# Patient Record
Sex: MALE | Race: WHITE | NOT HISPANIC OR LATINO
[De-identification: names, ages, dates, MRNs, and addresses within clinical notes are randomized per-mention and may not be internally consistent; named-entity substitution may affect disease eponyms.]

---

## 2018-06-17 ENCOUNTER — TRANSCRIPTION ENCOUNTER (OUTPATIENT)
Age: 23
End: 2018-06-17

## 2018-06-29 ENCOUNTER — TRANSCRIPTION ENCOUNTER (OUTPATIENT)
Age: 23
End: 2018-06-29

## 2018-06-29 ENCOUNTER — INPATIENT (INPATIENT)
Facility: HOSPITAL | Age: 23
LOS: 8 days | Discharge: HOME | End: 2018-07-08
Attending: THORACIC SURGERY (CARDIOTHORACIC VASCULAR SURGERY) | Admitting: THORACIC SURGERY (CARDIOTHORACIC VASCULAR SURGERY)
Payer: SUBSIDIZED

## 2018-06-29 VITALS
OXYGEN SATURATION: 99 % | DIASTOLIC BLOOD PRESSURE: 92 MMHG | SYSTOLIC BLOOD PRESSURE: 135 MMHG | HEART RATE: 133 BPM | TEMPERATURE: 101 F | RESPIRATION RATE: 20 BRPM

## 2018-06-29 LAB
ALBUMIN SERPL ELPH-MCNC: 4 G/DL — SIGNIFICANT CHANGE UP (ref 3.5–5.2)
ALP SERPL-CCNC: 91 U/L — SIGNIFICANT CHANGE UP (ref 30–115)
ALT FLD-CCNC: 53 U/L — HIGH (ref 0–41)
ANION GAP SERPL CALC-SCNC: 15 MMOL/L — HIGH (ref 7–14)
APTT BLD: 30.1 SEC — SIGNIFICANT CHANGE UP (ref 27–39.2)
AST SERPL-CCNC: 54 U/L — HIGH (ref 0–41)
BASOPHILS # BLD AUTO: 0.02 K/UL — SIGNIFICANT CHANGE UP (ref 0–0.2)
BASOPHILS NFR BLD AUTO: 0.1 % — SIGNIFICANT CHANGE UP (ref 0–1)
BILIRUB SERPL-MCNC: 1.6 MG/DL — HIGH (ref 0.2–1.2)
BUN SERPL-MCNC: 11 MG/DL — SIGNIFICANT CHANGE UP (ref 10–20)
CALCIUM SERPL-MCNC: 9.2 MG/DL — SIGNIFICANT CHANGE UP (ref 8.5–10.1)
CHLORIDE SERPL-SCNC: 94 MMOL/L — LOW (ref 98–110)
CO2 SERPL-SCNC: 25 MMOL/L — SIGNIFICANT CHANGE UP (ref 17–32)
CREAT SERPL-MCNC: 0.8 MG/DL — SIGNIFICANT CHANGE UP (ref 0.7–1.5)
EOSINOPHIL # BLD AUTO: 0.04 K/UL — SIGNIFICANT CHANGE UP (ref 0–0.7)
EOSINOPHIL NFR BLD AUTO: 0.3 % — SIGNIFICANT CHANGE UP (ref 0–8)
GAS PNL BLDV: SIGNIFICANT CHANGE UP
GLUCOSE SERPL-MCNC: 107 MG/DL — HIGH (ref 70–99)
HCT VFR BLD CALC: 41.2 % — LOW (ref 42–52)
HGB BLD-MCNC: 14.2 G/DL — SIGNIFICANT CHANGE UP (ref 14–18)
IMM GRANULOCYTES NFR BLD AUTO: 0.6 % — HIGH (ref 0.1–0.3)
INR BLD: 1.68 RATIO — HIGH (ref 0.65–1.3)
LYMPHOCYTES # BLD AUTO: 1.11 K/UL — LOW (ref 1.2–3.4)
LYMPHOCYTES # BLD AUTO: 7.6 % — LOW (ref 20.5–51.1)
MCHC RBC-ENTMCNC: 30.9 PG — SIGNIFICANT CHANGE UP (ref 27–31)
MCHC RBC-ENTMCNC: 34.5 G/DL — SIGNIFICANT CHANGE UP (ref 32–37)
MCV RBC AUTO: 89.8 FL — SIGNIFICANT CHANGE UP (ref 80–94)
MONOCYTES # BLD AUTO: 1.55 K/UL — HIGH (ref 0.1–0.6)
MONOCYTES NFR BLD AUTO: 10.6 % — HIGH (ref 1.7–9.3)
MRSA PCR RESULT.: NEGATIVE — SIGNIFICANT CHANGE UP
NEUTROPHILS # BLD AUTO: 11.85 K/UL — HIGH (ref 1.4–6.5)
NEUTROPHILS NFR BLD AUTO: 80.8 % — HIGH (ref 42.2–75.2)
PLATELET # BLD AUTO: 345 K/UL — SIGNIFICANT CHANGE UP (ref 130–400)
POTASSIUM SERPL-MCNC: 4.2 MMOL/L — SIGNIFICANT CHANGE UP (ref 3.5–5)
POTASSIUM SERPL-SCNC: 4.2 MMOL/L — SIGNIFICANT CHANGE UP (ref 3.5–5)
PROT SERPL-MCNC: 7.7 G/DL — SIGNIFICANT CHANGE UP (ref 6–8)
PROTHROM AB SERPL-ACNC: 18.1 SEC — HIGH (ref 9.95–12.87)
RBC # BLD: 4.59 M/UL — LOW (ref 4.7–6.1)
RBC # FLD: 11.6 % — SIGNIFICANT CHANGE UP (ref 11.5–14.5)
SODIUM SERPL-SCNC: 134 MMOL/L — LOW (ref 135–146)
WBC # BLD: 14.66 K/UL — HIGH (ref 4.8–10.8)
WBC # FLD AUTO: 14.66 K/UL — HIGH (ref 4.8–10.8)

## 2018-06-29 RX ORDER — VANCOMYCIN HCL 1 G
1750 VIAL (EA) INTRAVENOUS EVERY 12 HOURS
Qty: 0 | Refills: 0 | Status: DISCONTINUED | OUTPATIENT
Start: 2018-06-29 | End: 2018-06-30

## 2018-06-29 RX ORDER — ENOXAPARIN SODIUM 100 MG/ML
40 INJECTION SUBCUTANEOUS DAILY
Qty: 0 | Refills: 0 | Status: DISCONTINUED | OUTPATIENT
Start: 2018-06-29 | End: 2018-07-02

## 2018-06-29 RX ORDER — ACETAMINOPHEN 500 MG
650 TABLET ORAL ONCE
Qty: 0 | Refills: 0 | Status: COMPLETED | OUTPATIENT
Start: 2018-06-29 | End: 2018-06-29

## 2018-06-29 RX ORDER — MEROPENEM 1 G/30ML
1000 INJECTION INTRAVENOUS EVERY 8 HOURS
Qty: 0 | Refills: 0 | Status: DISCONTINUED | OUTPATIENT
Start: 2018-06-29 | End: 2018-06-30

## 2018-06-29 RX ORDER — SODIUM CHLORIDE 9 MG/ML
3000 INJECTION, SOLUTION INTRAVENOUS ONCE
Qty: 0 | Refills: 0 | Status: COMPLETED | OUTPATIENT
Start: 2018-06-29 | End: 2018-06-29

## 2018-06-29 RX ORDER — ACETAMINOPHEN 500 MG
650 TABLET ORAL EVERY 6 HOURS
Qty: 0 | Refills: 0 | Status: DISCONTINUED | OUTPATIENT
Start: 2018-06-29 | End: 2018-07-02

## 2018-06-29 RX ORDER — CEFTRIAXONE 500 MG/1
1 INJECTION, POWDER, FOR SOLUTION INTRAMUSCULAR; INTRAVENOUS ONCE
Qty: 0 | Refills: 0 | Status: COMPLETED | OUTPATIENT
Start: 2018-06-29 | End: 2018-06-29

## 2018-06-29 RX ORDER — AZITHROMYCIN 500 MG/1
500 TABLET, FILM COATED ORAL ONCE
Qty: 0 | Refills: 0 | Status: COMPLETED | OUTPATIENT
Start: 2018-06-29 | End: 2018-06-29

## 2018-06-29 RX ORDER — SODIUM CHLORIDE 9 MG/ML
1000 INJECTION, SOLUTION INTRAVENOUS ONCE
Qty: 0 | Refills: 0 | Status: COMPLETED | OUTPATIENT
Start: 2018-06-29 | End: 2018-06-29

## 2018-06-29 RX ORDER — SODIUM CHLORIDE 9 MG/ML
1000 INJECTION INTRAMUSCULAR; INTRAVENOUS; SUBCUTANEOUS
Qty: 0 | Refills: 0 | Status: DISCONTINUED | OUTPATIENT
Start: 2018-06-29 | End: 2018-06-30

## 2018-06-29 RX ADMIN — SODIUM CHLORIDE 2000 MILLILITER(S): 9 INJECTION, SOLUTION INTRAVENOUS at 14:58

## 2018-06-29 RX ADMIN — Medication 250 MILLIGRAM(S): at 18:28

## 2018-06-29 RX ADMIN — Medication 650 MILLIGRAM(S): at 13:39

## 2018-06-29 RX ADMIN — MEROPENEM 100 MILLIGRAM(S): 1 INJECTION INTRAVENOUS at 17:44

## 2018-06-29 RX ADMIN — SODIUM CHLORIDE 125 MILLILITER(S): 9 INJECTION INTRAMUSCULAR; INTRAVENOUS; SUBCUTANEOUS at 17:44

## 2018-06-29 RX ADMIN — Medication 650 MILLIGRAM(S): at 20:02

## 2018-06-29 RX ADMIN — AZITHROMYCIN 255 MILLIGRAM(S): 500 TABLET, FILM COATED ORAL at 12:00

## 2018-06-29 RX ADMIN — SODIUM CHLORIDE 2000 MILLILITER(S): 9 INJECTION, SOLUTION INTRAVENOUS at 11:30

## 2018-06-29 RX ADMIN — Medication 650 MILLIGRAM(S): at 21:19

## 2018-06-29 RX ADMIN — CEFTRIAXONE 100 GRAM(S): 500 INJECTION, POWDER, FOR SOLUTION INTRAMUSCULAR; INTRAVENOUS at 11:30

## 2018-06-29 RX ADMIN — MEROPENEM 100 MILLIGRAM(S): 1 INJECTION INTRAVENOUS at 23:40

## 2018-06-29 NOTE — H&P ADULT - HISTORY OF PRESENT ILLNESS
22 y/o M with no PMHx presenting for 2 wk hx of cough, high grade temp of 103F, and red+swollen+purulent d/c from his tonsils. He went to  on the 17th and they sent him home with amoxicillin and 5 day course of prednisone. He is still on the abx course but he finished the prednisone taper. Now he is seeking medical attention bc his cough has become more productive, with thick green sputum production, as well as persistent fevers, and L sided pleuritic chest pain that worsens with deep breaths. CXR done here shows loculated left sided pleural effusion and +sepsis criteria. Case was d/w IR Dr Stark who feels there is not enough fluid to warrant a thoracentesis and recommended against ct chest due to radiation in a young pt. However, case was d/w Pulm fellow who recommended pursuing ct chest and providing broad coverage abx. Pt denies any vomiting/nausea, but has been having dec po intake for the past 2 wks.

## 2018-06-29 NOTE — ED PROVIDER NOTE - OBJECTIVE STATEMENT
24 yo M with no pmhx presents with fever, left rib pain and cough. Rib pain and cough started 5 days ago, progressively worsened. Cough productive with whitish sputum. Fever started yesterday, Tmax 102. Denies CP, neck pain, headache, abd pain, NVCD, back pain, dysuria.

## 2018-06-29 NOTE — CONSULT NOTE ADULT - ASSESSMENT
Impression:  - PNA following URI associated with loculated effusion    Recommendations:  - ABx coverage vanco/seth for now  - CT chest  - Impression:  - PNA following URI   - Pleural effusion on Cxr    Recommendations:  - ABx coverage vanco/seth for now  - MRSA nasal swab  - Bedside US didn't show flowing pleural fluid. CT chest to evaluated for loculated effusion/empyema   If present CT surgery eval  - OOB  - DVT Px

## 2018-06-29 NOTE — H&P ADULT - ASSESSMENT
Sepsis 2/2 PNA with Loculated Left Sided Pleural Effusion Sepsis 2/2 PNA with Loculated Left Sided Pleural Effusion, possibly post viral  -start vanc and seth, ID c/s  -check RSVP, strep throat, nasal mrsa, urine legionella, blood cx  -check CT Chest non, Pulm c/s, IR c/s  -pt was tachy and febrile on arrival, s/p 4L with improvement in vitals, will maintain on IVF   -check hepatitis panel, HIV  -elevated liver enzymes/coags could be due to viral inf, will trend Sepsis 2/2 PNA with Loculated Left Sided Pleural Effusion, possibly post viral  -start vanc and seth, ID c/s  -check RSVP, strep throat, nasal mrsa, urine legionella, blood cx  -check CT Chest non, Pulm c/s, IR c/s  -if loculated on ct, please reconsult IR, but if they do not want to intervene, then consult CTSx  -pt was tachy and febrile on arrival, s/p 4L with improvement in vitals, will maintain on IVF   -check hepatitis panel, HIV  -elevated liver enzymes/coags could be due to viral inf, will trend

## 2018-06-29 NOTE — H&P ADULT - NSHPLABSRESULTS_GEN_ALL_CORE
14.2   14.66 )-----------( 345      ( 29 Jun 2018 11:26 )             41.2   06-29  134<L>  |  94<L>  |  11  ----------------------------<  107<H>  4.2   |  25  |  0.8  Ca    9.2      29 Jun 2018 11:26  TPro  7.7  /  Alb  4.0  /  TBili  1.6<H>  /  DBili  x   /  AST  54<H>  /  ALT  53<H>  /  AlkPhos  91  06-29  LIVER FUNCTIONS - ( 29 Jun 2018 11:26 )  Alb: 4.0 g/dL / Pro: 7.7 g/dL / ALK PHOS: 91 U/L / ALT: 53 U/L / AST: 54 U/L / GGT: x        PT/INR - ( 29 Jun 2018 11:26 )   PT: 18.10 sec;   INR: 1.68 ratio    PTT - ( 29 Jun 2018 11:26 )  PTT:30.1 sec 14.2   14.66 )-----------( 345      ( 29 Jun 2018 11:26 )             41.2   06-29  134<L>  |  94<L>  |  11  ----------------------------<  107<H>  4.2   |  25  |  0.8  Ca    9.2      29 Jun 2018 11:26  TPro  7.7  /  Alb  4.0  /  TBili  1.6<H>  /  DBili  x   /  AST  54<H>  /  ALT  53<H>  /  AlkPhos  91  06-29  LIVER FUNCTIONS - ( 29 Jun 2018 11:26 )  Alb: 4.0 g/dL / Pro: 7.7 g/dL / ALK PHOS: 91 U/L / ALT: 53 U/L / AST: 54 U/L / GGT: x        PT/INR - ( 29 Jun 2018 11:26 )   PT: 18.10 sec;   INR: 1.68 ratio    PTT - ( 29 Jun 2018 11:26 )  PTT:30.1 sec    CXR: Loculated left sided pleural effusion with left basilar opacity.  Small right basilar effusion and opacity.

## 2018-06-29 NOTE — ED ADULT NURSE NOTE - OBJECTIVE STATEMENT
pt comes in with productive cough, fever, and left rib pain when he takes deep breathes. is on amox 500 bid from the 17th and finished his prednisone taper.

## 2018-06-29 NOTE — ED PROVIDER NOTE - ATTENDING CONTRIBUTION TO CARE
23 y.o. M, no PMH, c/o fever/chills, cough, left sided rib pain for 5 days, getting progressively worse. No n/v/c/d. No leg pain/swelling. No travel. On exam, pt in NAD, AAOx3, head NC/AT, CN II-XII intact, lungs slightly decreased BS on the left, CTA on the right, CV S1S2 regular, abdomen soft/NT/ND/(+)BS, ext (-) edema. Will do labs, CXR, and reevaluate.

## 2018-06-29 NOTE — H&P ADULT - NSHPPHYSICALEXAM_GEN_ALL_CORE
General: well appearing gentleman sitting up in bed  HEENT: +swollen, erythematous tonsils bilaterally, no purulence noted which was appreciated from 1 wk prior based on pics the pt had on his phone  Cardiac: RRR, S1S2  Lungs: CTAB, +pleuritic chest pain on deep inspiration  Abd: NTND, +BS  LE: no edema T(F): 103.1 (06-29-18 @ 23:35), Max: 103.1 (06-29-18 @ 23:35)  HR: 115 (06-29-18 @ 23:35) (81 - 133)  BP: 137/80 (06-29-18 @ 23:35) (129/79 - 137/80)  RR: 20 (06-29-18 @ 23:35) (18 - 20)  SpO2: 99% (06-29-18 @ 23:35) (95% - 100%)    General: well appearing gentleman sitting up in bed  HEENT: +swollen, erythematous tonsils bilaterally, no purulence noted which was appreciated from 1 wk prior based on pics the pt had on his phone  Cardiac: RRR, S1S2  Lungs: CTAB, +pleuritic chest pain on deep inspiration  Abd: NTND, +BS  LE: no edema

## 2018-06-29 NOTE — ED PROVIDER NOTE - NS ED ROS FT
Constitutional: See HPI.  Eyes: No visual changes, eye pain or discharge.  ENMT: No neck pain or stiffness.  Cardiac: No chest pain, SOB or edema. No chest pain with exertion.  Respiratory: +cough . No respiratory distress.   GI: No nausea, vomiting, diarrhea or abdominal pain.  : No dysuria, frequency or burning.  MS: No myalgia, muscle weakness, joint pain or back pain.  Neuro: No headache or weakness. No LOC.  Skin: No skin rash.

## 2018-06-29 NOTE — H&P ADULT - ATTENDING COMMENTS
22 y/o M with no PMHx presenting for 2 wk hx of cough, high grade temp of 103F, and red+swollen+purulent d/c from his tonsils. He went to  on the 17th and they sent him home with amoxicillin and 5 day course of prednisone. He is still on the abx course but he finished the prednisone taper. Now he is seeking medical attention bc his cough has become more productive, with thick green sputum production, as well as persistent fevers, and L sided pleuritic chest pain that worsens with deep breaths. CXR done here shows loculated left sided pleural effusion and +sepsis criteria. Case was d/w IR Dr Stark who feels there is not enough fluid to warrant a thoracentesis and recommended against ct chest due to radiation in a young pt. However, case was d/w Pulm fellow who recommended pursuing ct chest and providing broad coverage abx. Pt denies any vomiting/nausea, but has been having dec po intake for the past 2 wks.    ROS:  CONSTITUTIONAL: No fever, chills, generalized weakness or fatigue.  EYES: No eye pain, visual disturbances, or discharge.  ENMT:  No difficulty hearing, tinnitus, vertigo; No sinus or throat pain.  RESPIRATORY: No cough, wheezing, chills or hemoptysis; No shortness of breath.  CARDIOVASCULAR: No chest pain, palpitations, dizziness, or leg swelling.  GASTROINTESTINAL: No abdominal or epigastric pain. No nausea, vomiting, or hematemesis; No diarrhea or constipation. No melena or hematochezia.  GENITOURINARY: No dysuria, frequency, hematuria, or incontinence.  NEUROLOGICAL: No headaches, memory loss, loss of strength, numbness, or tremors.  ENDOCRINE: No heat or cold intolerance; No hair loss.  MUSCULOSKELETAL: No joint pain or swelling; No muscle, back, or extremity pain.  HEME/LYMPH: No easy bruising, or bleeding gums.  SKIN: No rash or itchiness.    PE:  GENERAL: NAD, well-groomed, well-developed.  HEAD:  Atraumatic, Normocephalic.  EYES: EOMI, PERRLA, conjunctiva and sclera clear.  ENMT: Moist mucous membranes, Good dentition, No lesions.  NERVOUS SYSTEM:  Alert & Oriented X3, Good concentration; No limb weakness or numbness.  RESPIRATORY: Clear to percussion bilaterally; No rales, rhonchi, wheezing, or rubs.  CARDIOVASCULAR: Regular rate and rhythm; No murmurs, rubs, or gallops.  GASTROINTESTINAL: Soft, Nontender, Nondistended; Bowel sounds present.  MUSCULOSKELETAL: Motor Strength 5/5 B/L upper and lower extremities;   EXTREMITIES:  2+ Peripheral Pulses, No clubbing, cyanosis, or edema.  LYMPH: No lymphadenopathy noted.  SKIN: No rashes or lesions.    Sepsis 2/2 PNA with Loculated Left Sided Pleural Effusion, possibly post viral  -start vanc and seth, ID c/s  -check RSVP, strep throat, nasal mrsa, urine legionella, blood cx  -check CT Chest non, Pulm c/s, IR c/s  -if loculated on ct, please reconsult IR, but if they do not want to intervene, then consult CTSx  -pt was tachy and febrile on arrival, s/p 4L with improvement in vitals, will maintain on IVF   -check hepatitis panel, HIV  -elevated liver enzymes/coags could be due to viral inf, will trend    All labs, radiologic studies, vitals, orders and medications list reviewed. Patient is seen and examined at bedside. Case discussed with medical team. 22 y/o M with no previous PMH presented with progressive productive cough with , fever up [id number], and red+swollen+purulent discharge from his tonsils for past 2 weeks no improved with outpatient course of amoxicillin and prednisone. He is still on the abx course but he finished the prednisone taper. Now he is seeking medical attention bc his cough has become more productive, with thick green sputum production, as well as persistent fevers, and L sided pleuritic chest pain that worsens with deep breaths. CXR done here shows loculated left sided pleural effusion and +sepsis criteria. Case was d/w IR Dr Stark who feels there is not enough fluid to warrant a thoracentesis and recommended against ct chest due to radiation in a young pt. However, case was d/w Pulm fellow who recommended pursuing ct chest and providing broad coverage abx. Pt denies any vomiting/nausea, but has been having dec po intake for the past 2 wks.    ROS:  CONSTITUTIONAL: No fever, chills, generalized weakness or fatigue.  EYES: No eye pain, visual disturbances, or discharge.  ENMT:  No difficulty hearing, tinnitus, vertigo; No sinus or throat pain.  RESPIRATORY: No cough, wheezing, chills or hemoptysis; No shortness of breath.  CARDIOVASCULAR: No chest pain, palpitations, dizziness, or leg swelling.  GASTROINTESTINAL: No abdominal or epigastric pain. No nausea, vomiting, or hematemesis; No diarrhea or constipation. No melena or hematochezia.  GENITOURINARY: No dysuria, frequency, hematuria, or incontinence.  NEUROLOGICAL: No headaches, memory loss, loss of strength, numbness, or tremors.  ENDOCRINE: No heat or cold intolerance; No hair loss.  MUSCULOSKELETAL: No joint pain or swelling; No muscle, back, or extremity pain.  HEME/LYMPH: No easy bruising, or bleeding gums.  SKIN: No rash or itchiness.    PE:  GENERAL: NAD, well-groomed, well-developed.  HEAD:  Atraumatic, Normocephalic.  EYES: EOMI, PERRLA, conjunctiva and sclera clear.  ENMT: Moist mucous membranes, Good dentition, No lesions.  NERVOUS SYSTEM:  Alert & Oriented X3, Good concentration; No limb weakness or numbness.  RESPIRATORY: Clear to percussion bilaterally; No rales, rhonchi, wheezing, or rubs.  CARDIOVASCULAR: Regular rate and rhythm; No murmurs, rubs, or gallops.  GASTROINTESTINAL: Soft, Nontender, Nondistended; Bowel sounds present.  MUSCULOSKELETAL: Motor Strength 5/5 B/L upper and lower extremities;   EXTREMITIES:  2+ Peripheral Pulses, No clubbing, cyanosis, or edema.  LYMPH: No lymphadenopathy noted.  SKIN: No rashes or lesions.    Sepsis 2/2 PNA with Loculated Left Sided Pleural Effusion, possibly post viral  -start vanc and seth, ID c/s  -check RSVP, strep throat, nasal mrsa, urine legionella, blood cx  -check CT Chest non, Pulm c/s, IR c/s  -if loculated on ct, please reconsult IR, but if they do not want to intervene, then consult CTSx  -pt was tachy and febrile on arrival, s/p 4L with improvement in vitals, will maintain on IVF   -check hepatitis panel, HIV  -elevated liver enzymes/coags could be due to viral inf, will trend    All labs, radiologic studies, vitals, orders and medications list reviewed. Patient is seen and examined at bedside. Case discussed with medical team. 22 y/o M with no previous PMH presented with progressive productive cough with green sputum, persistent fever up to 103F, and red, swollen, purulent discharge from his tonsils for past 2 weeks not improved with outpatient course of amoxicillin and prednisone. Patient started to develop pleuritic left sided chest pain worse with breathing and movement. No prior history of immunosuppression or recurrent infections as a child.    ROS:  CONSTITUTIONAL: (+) fever. No chills, generalized weakness or fatigue.  EYES: No eye pain, visual disturbances, or discharge.  ENMT:  No difficulty hearing, tinnitus, vertigo; (+) Throat pain with red, swollen, purulent discharge from his tonsils  RESPIRATORY: (+) productive cough. No wheezing or hemoptysis; No shortness of breath.  CARDIOVASCULAR: Left side pleuritic chest pain. No palpitations, dizziness, or leg swelling.  GASTROINTESTINAL: No abdominal or epigastric pain. No nausea, vomiting, or hematemesis; No diarrhea or constipation. No melena or hematochezia.  GENITOURINARY: No dysuria, frequency, hematuria, or incontinence.  NEUROLOGICAL: No headaches, memory loss, loss of strength, numbness, or tremors.  ENDOCRINE: No heat or cold intolerance; No hair loss.  MUSCULOSKELETAL: No joint pain or swelling; No muscle, back, or extremity pain.  HEME/LYMPH: No easy bruising, or bleeding gums.  SKIN: No rash or itchiness.    PE:  GENERAL: NAD, well-groomed, well-developed.  HEAD:  Atraumatic, Normocephalic.  EYES: EOMI, scleral clear.   NERVOUS SYSTEM:  Alert & Oriented X3, Good concentration; No limb weakness or numbness.  RESPIRATORY: Decreased bibasilar breath sounds left greater than right. No wheezes, rales or rhonchi.  CARDIOVASCULAR: Tachycardic. Regular rhythm; No murmurs, rubs, or gallops.  GASTROINTESTINAL: Soft, Nontender, Nondistended; Bowel sounds present.  MUSCULOSKELETAL: Motor Strength 5/5 B/L upper and lower extremities;   EXTREMITIES:  2+ Peripheral Pulses, No clubbing, cyanosis, or edema.  LYMPH: Tender and enlarged tonsils.  SKIN: No rashes or lesions.    # Sepsis 2/2 Pneumonia with Loculated Left Sided Pleural Effusion  - started on IV vancomycin and IV meropenem   - follow up RSVP, strep throat, nasal MRSA, urine legionella, blood cultures  - order for CT Chest, awaiting result; CT Surgery to evaluate in AM regarding need for intervention  - f/u ID and Pulm evaluation    # Mild transaminitis, elevated bili and abnormal coagulation profile  - check hepatitis panel, HIV  - trend hepatic panel, PT/INR    # DVT prophylaxis  - on lovenox subcut  All labs, radiologic studies, vitals, orders and medications list reviewed. Patient is seen and examined at bedside. Case discussed with medical team. 22 y/o M with no previous PMH presented with progressive productive cough with green sputum, persistent fever up to 103F, and red, swollen, purulent discharge from his tonsils for past 2 weeks not improved with outpatient course of amoxicillin and prednisone. Patient started to develop pleuritic left sided chest pain worse with breathing and movement. No prior history of immunosuppression or recurrent infections as a child.    ROS:  CONSTITUTIONAL: (+) fever. No chills, generalized weakness or fatigue.  EYES: No eye pain, visual disturbances, or discharge.  ENMT:  No difficulty hearing, tinnitus, vertigo; (+) Throat pain with red, swollen, purulent discharge from his tonsils  RESPIRATORY: (+) productive cough. No wheezing or hemoptysis; No shortness of breath.  CARDIOVASCULAR: Left side pleuritic chest pain. No palpitations, dizziness, or leg swelling.  GASTROINTESTINAL: No abdominal or epigastric pain. No nausea, vomiting, or hematemesis; No diarrhea or constipation. No melena or hematochezia.  GENITOURINARY: No dysuria, frequency, hematuria, or incontinence.  NEUROLOGICAL: No headaches, memory loss, loss of strength, numbness, or tremors.  ENDOCRINE: No heat or cold intolerance; No hair loss.  MUSCULOSKELETAL: No joint pain or swelling; No muscle, back, or extremity pain.  HEME/LYMPH: No easy bruising, or bleeding gums.  SKIN: No rash or itchiness.    PE:  GENERAL: NAD, well-groomed, well-developed.  HEAD:  Atraumatic, Normocephalic.  EYES: EOMI, scleral clear.   NERVOUS SYSTEM:  Alert & Oriented X3, Good concentration; No limb weakness or numbness.  RESPIRATORY: Decreased bibasilar breath sounds left greater than right. No wheezes, rales or rhonchi.  CARDIOVASCULAR: Tachycardic. Regular rhythm; No murmurs, rubs, or gallops.  GASTROINTESTINAL: Soft, Nontender, Nondistended; Bowel sounds present.  MUSCULOSKELETAL: Motor Strength 5/5 B/L upper and lower extremities;   EXTREMITIES:  2+ Peripheral Pulses, No clubbing, cyanosis, or edema.  LYMPH: Tender and enlarged tonsils.  SKIN: No rashes or lesions.    # Sepsis 2/2 Pneumonia with Loculated Left Sided Pleural Effusion  - started on IV vancomycin and IV meropenem; check vancomycin trough   - follow up RSVP, strep throat, nasal MRSA, urine legionella, blood cultures  - order for CT Chest, awaiting result; CT Surgery to evaluate in AM regarding need for intervention  - f/u ID and Pulm evaluation    # Mild transaminitis, elevated bili and abnormal coagulation profile  - check hepatitis panel, HIV  - trend hepatic panel, PT/INR/PTT    # DVT prophylaxis  - on lovenox subcut  All labs, radiologic studies, vitals, orders and medications list reviewed. Patient is seen and examined at bedside. Case discussed with medical team.

## 2018-06-29 NOTE — ED PROVIDER NOTE - PHYSICAL EXAMINATION
AOx4, Non toxic appearing, NAD, speaking in full sentences. Skin  warm and dry, no acute rash. Head normocephalic, atraumatic. PERRLA/EOMI, conjunctiva and sclera clear. MM moist, no nasal discharge.  Pharynx and TM's unremarkable.  No mastoid or temporal ttp. Neck supple nt, no meningeal signs. Heart RRR s1s2 nl, no rub/murmur. Lungs- No retractions, decreased BS on the left lower lung field. Abdomen soft ntnd no r/g. Extremities- moves all, +equal distal pulses, brisk cap refill, sensation wnl, normal ROM. No LE edema, calves nttp b/l.

## 2018-06-29 NOTE — CHART NOTE - NSCHARTNOTEFT_GEN_A_CORE
Spoke to Dr. Caballero - the CT Surgeon on call, over the phone, discussed the case. Placed a consult for CT surgery and ordered Blood and Sputum cultures as requested. His team will see the patient tomorrow. Spoke to Dr. Caballero - the CT Surgeon on call, over the phone, discussed the case. Placed a consult for CT surgery and ordered Blood and Sputum cultures as requested. His team will see the patient tomorrow; not acutely concerned about the pt overnight.

## 2018-06-29 NOTE — ED PROVIDER NOTE - PROGRESS NOTE DETAILS
Sepsis suspected at this time. Patient mildly hypoxic with ambualtion, desating to 94%, tachycardiac to 114. Will admit

## 2018-06-29 NOTE — H&P ADULT - FAMILY HISTORY
Family history of pneumonia     Father  Still living? Unknown  Family history of leukemia, Age at diagnosis: Age Unknown  Family history of hypertension, Age at diagnosis: Age Unknown     Mother  Still living? Unknown  Family history of hypertension, Age at diagnosis: Age Unknown

## 2018-06-29 NOTE — H&P ADULT - NSHPSOCIALHISTORY_GEN_ALL_CORE
Occaisional weed, but never smoker. Does not use any illicit drugs. Has one sex partner, monogamous. Occasional weed, but never smoker. Does not use any illicit drugs. Has one sex partner, monogamous.

## 2018-06-29 NOTE — CHART NOTE - NSCHARTNOTEFT_GEN_A_CORE
I had sign out from admitting resident to check Ct chest to r/o empyema  earlier today CT chest was not done because patient was not in his room, I called and expedite doing it  CT chest was done  I called radiology twice till now to get the result and no report yet  Sign out given to float intern  Patient is Off RA, has some pain but stable for now  plan to check report and if positive for loculated empyema to call CT surgery for chest tube

## 2018-06-30 LAB
ALBUMIN SERPL ELPH-MCNC: 3.1 G/DL — LOW (ref 3.5–5.2)
ALP SERPL-CCNC: 86 U/L — SIGNIFICANT CHANGE UP (ref 30–115)
ALT FLD-CCNC: 53 U/L — HIGH (ref 0–41)
ANION GAP SERPL CALC-SCNC: 14 MMOL/L — SIGNIFICANT CHANGE UP (ref 7–14)
APPEARANCE UR: (no result)
APTT BLD: 29.3 SEC — SIGNIFICANT CHANGE UP (ref 27–39.2)
AST SERPL-CCNC: 36 U/L — SIGNIFICANT CHANGE UP (ref 0–41)
BASOPHILS # BLD AUTO: 0.02 K/UL — SIGNIFICANT CHANGE UP (ref 0–0.2)
BASOPHILS NFR BLD AUTO: 0.2 % — SIGNIFICANT CHANGE UP (ref 0–1)
BILIRUB SERPL-MCNC: 1.5 MG/DL — HIGH (ref 0.2–1.2)
BILIRUB UR-MCNC: NEGATIVE — SIGNIFICANT CHANGE UP
BUN SERPL-MCNC: 7 MG/DL — LOW (ref 10–20)
CALCIUM SERPL-MCNC: 8.3 MG/DL — LOW (ref 8.5–10.1)
CHLORIDE SERPL-SCNC: 99 MMOL/L — SIGNIFICANT CHANGE UP (ref 98–110)
CO2 SERPL-SCNC: 25 MMOL/L — SIGNIFICANT CHANGE UP (ref 17–32)
COLOR SPEC: SIGNIFICANT CHANGE UP
CREAT SERPL-MCNC: 0.6 MG/DL — LOW (ref 0.7–1.5)
DIFF PNL FLD: (no result)
EOSINOPHIL # BLD AUTO: 0.05 K/UL — SIGNIFICANT CHANGE UP (ref 0–0.7)
EOSINOPHIL NFR BLD AUTO: 0.4 % — SIGNIFICANT CHANGE UP (ref 0–8)
GLUCOSE SERPL-MCNC: 109 MG/DL — HIGH (ref 70–99)
GLUCOSE UR QL: NEGATIVE MG/DL — SIGNIFICANT CHANGE UP
HBV SURFACE AG SER-ACNC: SIGNIFICANT CHANGE UP
HCT VFR BLD CALC: 33.6 % — LOW (ref 42–52)
HCV AB S/CO SERPL IA: 0.13 S/CO — SIGNIFICANT CHANGE UP
HCV AB SERPL-IMP: SIGNIFICANT CHANGE UP
HGB BLD-MCNC: 11.4 G/DL — LOW (ref 14–18)
IMM GRANULOCYTES NFR BLD AUTO: 0.7 % — HIGH (ref 0.1–0.3)
INR BLD: 2.01 RATIO — HIGH (ref 0.65–1.3)
KETONES UR-MCNC: NEGATIVE — SIGNIFICANT CHANGE UP
LEUKOCYTE ESTERASE UR-ACNC: NEGATIVE — SIGNIFICANT CHANGE UP
LYMPHOCYTES # BLD AUTO: 1.53 K/UL — SIGNIFICANT CHANGE UP (ref 1.2–3.4)
LYMPHOCYTES # BLD AUTO: 11.9 % — LOW (ref 20.5–51.1)
MCHC RBC-ENTMCNC: 31.1 PG — HIGH (ref 27–31)
MCHC RBC-ENTMCNC: 33.9 G/DL — SIGNIFICANT CHANGE UP (ref 32–37)
MCV RBC AUTO: 91.8 FL — SIGNIFICANT CHANGE UP (ref 80–94)
MONOCYTES # BLD AUTO: 1.49 K/UL — HIGH (ref 0.1–0.6)
MONOCYTES NFR BLD AUTO: 11.6 % — HIGH (ref 1.7–9.3)
NEUTROPHILS # BLD AUTO: 9.66 K/UL — HIGH (ref 1.4–6.5)
NEUTROPHILS NFR BLD AUTO: 75.2 % — SIGNIFICANT CHANGE UP (ref 42.2–75.2)
NITRITE UR-MCNC: NEGATIVE — SIGNIFICANT CHANGE UP
PH UR: 6.5 — SIGNIFICANT CHANGE UP (ref 5–8)
PLATELET # BLD AUTO: 282 K/UL — SIGNIFICANT CHANGE UP (ref 130–400)
POTASSIUM SERPL-MCNC: 3.9 MMOL/L — SIGNIFICANT CHANGE UP (ref 3.5–5)
POTASSIUM SERPL-SCNC: 3.9 MMOL/L — SIGNIFICANT CHANGE UP (ref 3.5–5)
PROT SERPL-MCNC: 6.2 G/DL — SIGNIFICANT CHANGE UP (ref 6–8)
PROT UR-MCNC: (no result) MG/DL
PROTHROM AB SERPL-ACNC: 21.6 SEC — HIGH (ref 9.95–12.87)
RAPID RVP RESULT: SIGNIFICANT CHANGE UP
RBC # BLD: 3.66 M/UL — LOW (ref 4.7–6.1)
RBC # FLD: 11.9 % — SIGNIFICANT CHANGE UP (ref 11.5–14.5)
S PYO DNA THROAT QL NAA+PROBE: SIGNIFICANT CHANGE UP
SODIUM SERPL-SCNC: 138 MMOL/L — SIGNIFICANT CHANGE UP (ref 135–146)
SP GR SPEC: 1.02 — SIGNIFICANT CHANGE UP (ref 1.01–1.03)
UROBILINOGEN FLD QL: 2 MG/DL (ref 0.2–0.2)
WBC # BLD: 12.84 K/UL — HIGH (ref 4.8–10.8)
WBC # FLD AUTO: 12.84 K/UL — HIGH (ref 4.8–10.8)

## 2018-06-30 RX ORDER — CEFEPIME 1 G/1
INJECTION, POWDER, FOR SOLUTION INTRAMUSCULAR; INTRAVENOUS
Qty: 0 | Refills: 0 | Status: DISCONTINUED | OUTPATIENT
Start: 2018-06-30 | End: 2018-07-02

## 2018-06-30 RX ORDER — PHYTONADIONE (VIT K1) 5 MG
10 TABLET ORAL ONCE
Qty: 0 | Refills: 0 | Status: COMPLETED | OUTPATIENT
Start: 2018-06-30 | End: 2018-06-30

## 2018-06-30 RX ORDER — OXYCODONE HYDROCHLORIDE 5 MG/1
5 TABLET ORAL EVERY 4 HOURS
Qty: 0 | Refills: 0 | Status: DISCONTINUED | OUTPATIENT
Start: 2018-06-30 | End: 2018-07-02

## 2018-06-30 RX ORDER — SODIUM CHLORIDE 9 MG/ML
1000 INJECTION INTRAMUSCULAR; INTRAVENOUS; SUBCUTANEOUS
Qty: 0 | Refills: 0 | Status: DISCONTINUED | OUTPATIENT
Start: 2018-06-30 | End: 2018-07-02

## 2018-06-30 RX ORDER — CEFEPIME 1 G/1
2000 INJECTION, POWDER, FOR SOLUTION INTRAMUSCULAR; INTRAVENOUS EVERY 8 HOURS
Qty: 0 | Refills: 0 | Status: DISCONTINUED | OUTPATIENT
Start: 2018-06-30 | End: 2018-07-02

## 2018-06-30 RX ORDER — CEFEPIME 1 G/1
2000 INJECTION, POWDER, FOR SOLUTION INTRAMUSCULAR; INTRAVENOUS ONCE
Qty: 0 | Refills: 0 | Status: COMPLETED | OUTPATIENT
Start: 2018-06-30 | End: 2018-06-30

## 2018-06-30 RX ORDER — OXYCODONE HYDROCHLORIDE 5 MG/1
10 TABLET ORAL EVERY 4 HOURS
Qty: 0 | Refills: 0 | Status: DISCONTINUED | OUTPATIENT
Start: 2018-06-30 | End: 2018-07-02

## 2018-06-30 RX ORDER — IBUPROFEN 200 MG
600 TABLET ORAL EVERY 8 HOURS
Qty: 0 | Refills: 0 | Status: DISCONTINUED | OUTPATIENT
Start: 2018-06-30 | End: 2018-07-02

## 2018-06-30 RX ADMIN — OXYCODONE HYDROCHLORIDE 5 MILLIGRAM(S): 5 TABLET ORAL at 23:56

## 2018-06-30 RX ADMIN — CEFEPIME 100 MILLIGRAM(S): 1 INJECTION, POWDER, FOR SOLUTION INTRAMUSCULAR; INTRAVENOUS at 21:56

## 2018-06-30 RX ADMIN — Medication 650 MILLIGRAM(S): at 13:21

## 2018-06-30 RX ADMIN — MEROPENEM 100 MILLIGRAM(S): 1 INJECTION INTRAVENOUS at 07:02

## 2018-06-30 RX ADMIN — SODIUM CHLORIDE 100 MILLILITER(S): 9 INJECTION INTRAMUSCULAR; INTRAVENOUS; SUBCUTANEOUS at 15:07

## 2018-06-30 RX ADMIN — CEFEPIME 100 MILLIGRAM(S): 1 INJECTION, POWDER, FOR SOLUTION INTRAMUSCULAR; INTRAVENOUS at 11:35

## 2018-06-30 RX ADMIN — SODIUM CHLORIDE 125 MILLILITER(S): 9 INJECTION INTRAMUSCULAR; INTRAVENOUS; SUBCUTANEOUS at 06:33

## 2018-06-30 RX ADMIN — Medication 100 MILLIGRAM(S): at 14:30

## 2018-06-30 RX ADMIN — Medication 650 MILLIGRAM(S): at 21:08

## 2018-06-30 RX ADMIN — Medication 600 MILLIGRAM(S): at 11:40

## 2018-06-30 RX ADMIN — Medication 600 MILLIGRAM(S): at 18:03

## 2018-06-30 RX ADMIN — Medication 250 MILLIGRAM(S): at 06:33

## 2018-06-30 RX ADMIN — Medication 650 MILLIGRAM(S): at 14:30

## 2018-06-30 RX ADMIN — Medication 10 MILLIGRAM(S): at 14:06

## 2018-06-30 RX ADMIN — Medication 100 MILLIGRAM(S): at 21:09

## 2018-06-30 RX ADMIN — Medication 600 MILLIGRAM(S): at 17:01

## 2018-06-30 RX ADMIN — Medication 650 MILLIGRAM(S): at 02:38

## 2018-06-30 RX ADMIN — ENOXAPARIN SODIUM 40 MILLIGRAM(S): 100 INJECTION SUBCUTANEOUS at 11:41

## 2018-06-30 NOTE — PROGRESS NOTE ADULT - SUBJECTIVE AND OBJECTIVE BOX
IMPRESSION:      PLAN:    CNS: DAILY SEDATION VACATION    HEENT:  Oral care    PULMONARY:  HOB @ 45 degrees    CARDIOVASCULAR:    GI: GI prophylaxis                                          Feeding     RENAL:  F/u  lytes.  Correct as needed     INFECTIOUS DISEASE:    HEMATOLOGICAL:  DVT prophylaxis.    ENDOCRINE:  Follow up FS.  Insulin protocol if needed.    MUSCULOSKELETAL:    CODE STATUS: FULL CODE    DISPOSITION: Pt requires continued monitoring in the MICU

## 2018-06-30 NOTE — CONSULT NOTE ADULT - SUBJECTIVE AND OBJECTIVE BOX
TAMI JOHNSON  23y, Male  Allergy: No Known Allergies      HPI:  22 y/o M with no PMHx presenting for 2 wk hx of cough, high grade temp of 103F, and red+swollen+purulent d/c from his tonsils. He went to  on the 17th and they sent him home with amoxicillin and 5 day course of prednisone. He is still on the abx course but he finished the prednisone taper. Now he is seeking medical attention bc his cough has become more productive, with thick green sputum production, as well as persistent fevers, and L sided pleuritic chest pain that worsens with deep breaths. CXR done here shows loculated left sided pleural effusion and +sepsis criteria. Case was d/w IR Dr Stark who feels there is not enough fluid to warrant a thoracentesis and recommended against ct chest due to radiation in a young pt. However, case was d/w Pulm fellow who recommended pursuing ct chest and providing broad coverage abx. Pt denies any vomiting/nausea, but has been having dec po intake for the past 2 wks.     FAMILY HISTORY:  Family history of pneumonia  Family history of hypertension (Father, Mother)  Family history of leukemia (Father)    PAST MEDICAL & SURGICAL HISTORY:  No pertinent past medical history  No significant past surgical history        VITALS:  T(F): 97.8, Max: 103.1 (06-29-18 @ 23:35)  HR: 96  BP: 118/68  RR: 18Vital Signs Last 24 Hrs  T(C): 36.6 (30 Jun 2018 08:05), Max: 39.5 (29 Jun 2018 23:35)  T(F): 97.8 (30 Jun 2018 08:05), Max: 103.1 (29 Jun 2018 23:35)  HR: 96 (30 Jun 2018 08:05) (81 - 133)  BP: 118/68 (30 Jun 2018 08:05) (118/68 - 137/80)  BP(mean): 98 (29 Jun 2018 17:22) (98 - 98)  RR: 18 (30 Jun 2018 08:05) (18 - 20)  SpO2: 98% (30 Jun 2018 08:05) (95% - 100%)    TESTS & MEASUREMENTS:                        11.4   12.84 )-----------( 282      ( 30 Jun 2018 06:17 )             33.6     06-30    138  |  99  |  7<L>  ----------------------------<  109<H>  3.9   |  25  |  0.6<L>    Ca    8.3<L>      30 Jun 2018 06:17    TPro  6.2  /  Alb  3.1<L>  /  TBili  1.5<H>  /  DBili  x   /  AST  36  /  ALT  53<H>  /  AlkPhos  86  06-30    LIVER FUNCTIONS - ( 30 Jun 2018 06:17 )  Alb: 3.1 g/dL / Pro: 6.2 g/dL / ALK PHOS: 86 U/L / ALT: 53 U/L / AST: 36 U/L / GGT: x                   RADIOLOGY & ADDITIONAL TESTS:    ANTIBIOTICS:  cefepime   IVPB      clindamycin IVPB      levoFLOXacin IVPB

## 2018-06-30 NOTE — CHART NOTE - NSCHARTNOTEFT_GEN_A_CORE
General Thoracic Surgery Attestation    I have seen and examined the patient.  Where appropriate I have updated, edited, or corrected the resident's or PA's note with regard to findings, values, and plan.    multiloculated pleural effusion in the setting of recent upper airway infection.  discussed course of thora, fluid studied, chest tube, tpa, repeat imaging.  In a young patient within a 2 week window, with what seems like fairly rapid evolution of thoracic pathology (first symptoms of chest issue <1 week ago) would plan for up front decortication for maximum efficacy and long term lung function.  needs vitamin K to normalize INR, would prefer to avoid plasma if possible.  pain medication is certainly fine with me, as is a diet ad karlos until midnight tomorrow.  plan for left vats decortication monday a.m.

## 2018-06-30 NOTE — CONSULT NOTE ADULT - ASSESSMENT
IMPRESSION:  Sepsis secondary to LLL bacterial PNA with empyema.    RECOMMENDATIONS:  Blood cultures.   Urine for legionella antigen.  Decortication planned.  Levofloxacin 750 mg iv q24h  Clindamycin 900 mg iv q8h  Cefepime 2 gm iv q8h.

## 2018-06-30 NOTE — PROGRESS NOTE ADULT - ASSESSMENT
Impression:  - PNA following URI   - Pleural effusion on Cxr    Recommendations:  - ABx coverage vanco/seth for now  - MRSA nasal swab  - Bedside US didn't show flowing pleural fluid. CT chest to evaluated for loculated effusion/empyema   If present CT surgery eval  - OOB  - DVT Px

## 2018-06-30 NOTE — CONSULT NOTE ADULT - SUBJECTIVE AND OBJECTIVE BOX
GENERAL SURGERY CONSULT NOTE    TAMI JOHNSON  [id number]  [facility] [address]  23yMale  Hospital Day#: 1d    HPI:  24 y/o M with no PMHx presenting for 2 wk hx of cough, high grade temp of 103F, and red+swollen+purulent d/c from his tonsils. He went to  on the 17th and they sent him home with amoxicillin and 5 day course of prednisone. He is still on the abx course but he finished the prednisone taper. Now he is seeking medical attention bc his cough has become more productive, with thick green sputum production, as well as persistent fevers, and L sided pleuritic chest pain that worsens with deep breaths. CXR done here shows loculated left sided pleural effusion and +sepsis criteria. Case was d/w IR Dr Stark who feels there is not enough fluid to warrant a thoracentesis and recommended against ct chest due to radiation in a young pt. However, case was d/w Pulm fellow who recommended pursuing ct chest and providing broad coverage abx. Pt denies any vomiting/nausea, but has been having dec po intake for the past 2 wks. (29 Jun 2018 14:27)    PAST MEDICAL & SURGICAL HISTORY:  No pertinent past medical history  No significant past surgical history    Allergies: No Known Allergies    Home Medications: None    Vital Signs Last 24 Hrs  T(C): 36.6 (30 Jun 2018 08:05), Max: 39.5 (29 Jun 2018 23:35)  T(F): 97.8 (30 Jun 2018 08:05), Max: 103.1 (29 Jun 2018 23:35)  HR: 96 (30 Jun 2018 08:05) (81 - 120)  BP: 118/68 (30 Jun 2018 08:05) (118/68 - 137/80)  BP(mean): 98 (29 Jun 2018 17:22) (98 - 98)  RR: 18 (30 Jun 2018 08:05) (18 - 20)  SpO2: 98% (30 Jun 2018 08:05) (95% - 100%)    PHYSICAL EXAM  	General: well appearing gentleman sitting up in bed  	HEENT: +swollen, erythematous tonsils bilaterally, no purulence noted which was appreciated from 1 wk prior based on pics the pt had on his phone  	Cardiac: RRR, S1S2  	Lungs: CTAB, +pleuritic chest pain on deep inspiration  	Abd: NTND, +BS    LE: no edema    MEDICATIONS  (STANDING):  cefepime   IVPB 2000 milliGRAM(s) IV Intermittent every 8 hours  cefepime   IVPB      cefepime   IVPB 2000 milliGRAM(s) IV Intermittent once  clindamycin IVPB 900 milliGRAM(s) IV Intermittent once  clindamycin IVPB      clindamycin IVPB 900 milliGRAM(s) IV Intermittent every 8 hours  enoxaparin Injectable 40 milliGRAM(s) SubCutaneous daily  levoFLOXacin IVPB      levoFLOXacin IVPB 750 milliGRAM(s) IV Intermittent once  phytonadione   Solution 10 milliGRAM(s) Oral once  sodium chloride 0.9%. 1000 milliLiter(s) (125 mL/Hr) IV Continuous <Continuous>    MEDICATIONS  (PRN):  acetaminophen   Tablet. 650 milliGRAM(s) Oral every 6 hours PRN Moderate Pain (4 - 6)      LAB/STUDIES:             11.4   12.84 )-----------( 282      ( 30 Jun 2018 06:17 )             33.6     06-30    138  |  99  |  7<L>  ----------------------------<  109<H>  3.9   |  25  |  0.6<L>    Ca    8.3<L>      30 Jun 2018 06:17    TPro  6.2  /  Alb  3.1<L>  /  TBili  1.5<H>  /  DBili  x   /  AST  36  /  ALT  53<H>  /  AlkPhos  86  06-30    PT/INR - ( 30 Jun 2018 06:17 )   PT: 21.60 sec;   INR: 2.01 ratio         PTT - ( 30 Jun 2018 06:17 )  PTT:29.3 sec  LIVER FUNCTIONS - ( 30 Jun 2018 06:17 )  Alb: 3.1 g/dL / Pro: 6.2 g/dL / ALK PHOS: 86 U/L / ALT: 53 U/L / AST: 36 U/L / GGT: x           IMAGING:  CT Chest No Cont (06.29.18 @ 19:17) >  IMPRESSION:    1.  Loculated left pleural effusion with adjacent compressive atelectatic   changes of the left lower lobe.     2.  Left upper lobe pleural-based 1 cm solid nodule and right upper lobe   4 mm solid nodule, likely infectious in nature.

## 2018-06-30 NOTE — PROGRESS NOTE ADULT - SUBJECTIVE AND OBJECTIVE BOX
SUBJECTIVE:    Patient is a 23y old Male who presents with a chief complaint of Left sided pneumonia (29 Jun 2018 14:27)    Currently admitted to medicine with the primary diagnosis of Pneumonia     Today is hospital day 1d. This morning he is resting comfortably in bed and reports improvement in his breathing, he is able to take deep breath and feels much better.    PAST MEDICAL & SURGICAL HISTORY  No pertinent past medical history  No significant past surgical history    SOCIAL HISTORY:  Negative for smoking/alcohol/drug use.     ALLERGIES:  No Known Allergies    MEDICATIONS:  STANDING MEDICATIONS  enoxaparin Injectable 40 milliGRAM(s) SubCutaneous daily  meropenem  IVPB 1000 milliGRAM(s) IV Intermittent every 8 hours  sodium chloride 0.9%. 1000 milliLiter(s) IV Continuous <Continuous>  vancomycin  IVPB 1750 milliGRAM(s) IV Intermittent every 12 hours    PRN MEDICATIONS  acetaminophen   Tablet. 650 milliGRAM(s) Oral every 6 hours PRN    VITALS:   T(C): 36.6, Max: 39.5 (06-29-18 @ 23:35)  T(F): 97.8, Max: 103.1 (06-29-18 @ 23:35)  HR: 96 (81 - 133)  BP: 118/68 (118/68 - 137/80)  RR: 18 (18 - 20)  SpO2: 98% (95% - 100%)    LABS:                        11.4   12.84 )-----------( 282      ( 30 Jun 2018 06:17 )             33.6     06-30    138  |  99  |  7<L>  ----------------------------<  109<H>  3.9   |  25  |  0.6<L>    Ca    8.3<L>      30 Jun 2018 06:17    TPro  6.2  /  Alb  3.1<L>  /  TBili  1.5<H>  /  DBili  x   /  AST  36  /  ALT  53<H>  /  AlkPhos  86  06-30    PT/INR - ( 30 Jun 2018 06:17 )   PT: 21.60 sec;   INR: 2.01 ratio         PTT - ( 30 Jun 2018 06:17 )  PTT:29.3 sec      RADIOLOGY:    < from: CT Chest No Cont (06.29.18 @ 19:17) >  1.  Loculated left pleural effusion with adjacent compressive atelectatic   changes of the left lower lobe.     2.  Left upper lobe pleural-based 1 cm solid nodule and right upper lobe   4 mm solid nodule, likely infectious in nature.     < end of copied text >      PHYSICAL EXAM:  GEN: No acute distress  LUNGS: right sided crackles plus wheezing?  HEART: S1/S2 present. RRR.   ABD: Soft, non-tender, non-distended. Bowel sounds present  EXT: no edema  NEURO: AAOX3

## 2018-06-30 NOTE — PROGRESS NOTE ADULT - ASSESSMENT
22 y/o M with no previous PMH presented with progressive productive cough with green sputum, persistent fever up to 103F, and red, swollen, purulent discharge from his tonsils for past 2 weeks not improved with outpatient course of amoxicillin and prednisone (was not compliant). Patient started to develop pleuritic left sided chest pain worse with breathing and movement. No prior history of immunosuppression or recurrent infections as a child.    # Sepsis 2/2 Pneumonia with Loculated Left Sided Pleural Effusion  - on IV vancomycin and IV meropenem  -although nasal MRSA is negative we will continue IV vancomycin until further evaluation by ID  -CT surgery aware of the patient, awaiting their recommendations  -RSV negative  -strep throat, urine legionella, blood cultures pending    # Mild transaminitis, elevated bili and abnormal coagulation profile  - hepatitis panel negative, HIV negative  - will trend hepatic panel    #High INR = 2  -patient denies bleed except chronic intermittent epistaxis that he relates to deviated septum  -denies family hx of bleeding disorder  -can be vitamin K def 2/2 Atbx intake?  -will repeat levels    # DVT prophylaxis: lovenox 40s/c 24 y/o M with no previous PMH presented with progressive productive cough with green sputum, persistent fever up to 103F, and red, swollen, purulent discharge from his tonsils for past 2 weeks not improved with outpatient course of amoxicillin and prednisone (was not compliant). Patient started to develop pleuritic left sided chest pain worse with breathing and movement. No prior history of immunosuppression or recurrent infections as a child.    # Sepsis 2/2 Pneumonia with Loculated Left Sided Pleural Effusion  - atbx changed to Levaquin, clindamycin and cefepime as per ID recs  -nasal MRSA is negative  -CT surgery note appreciated: VATS decortication Monday 7/2  -RSV negative  -strep throat, urine legionella, blood cultures pending    # Mild transaminitis, elevated bili and abnormal coagulation profile  - hepatitis panel negative, HIV negative  - will trend hepatic panel    #High INR = 2  -patient denies bleed except chronic intermittent epistaxis that he relates to deviated septum  -denies family hx of bleeding disorder  -can be vitamin K def 2/2 Atbx intake?  -will give vitamin K since he is going to OR  -will repeat levels    # DVT prophylaxis: lovenox 40 mg s/c

## 2018-07-01 LAB
ALBUMIN SERPL ELPH-MCNC: 2.7 G/DL — LOW (ref 3.5–5.2)
ALBUMIN SERPL ELPH-MCNC: 2.9 G/DL — LOW (ref 3.5–5.2)
ALP SERPL-CCNC: 95 U/L — SIGNIFICANT CHANGE UP (ref 30–115)
ALP SERPL-CCNC: 98 U/L — SIGNIFICANT CHANGE UP (ref 30–115)
ALT FLD-CCNC: 58 U/L — HIGH (ref 0–41)
ALT FLD-CCNC: 63 U/L — HIGH (ref 0–41)
ANION GAP SERPL CALC-SCNC: 15 MMOL/L — HIGH (ref 7–14)
ANION GAP SERPL CALC-SCNC: 15 MMOL/L — HIGH (ref 7–14)
APTT BLD: 29.9 SEC — SIGNIFICANT CHANGE UP (ref 27–39.2)
APTT BLD: 30.4 SEC — SIGNIFICANT CHANGE UP (ref 27–39.2)
APTT BLD: 31.5 SEC — SIGNIFICANT CHANGE UP (ref 27–39.2)
AST SERPL-CCNC: 40 U/L — SIGNIFICANT CHANGE UP (ref 0–41)
AST SERPL-CCNC: 44 U/L — HIGH (ref 0–41)
BASOPHILS # BLD AUTO: 0.01 K/UL — SIGNIFICANT CHANGE UP (ref 0–0.2)
BASOPHILS NFR BLD AUTO: 0.1 % — SIGNIFICANT CHANGE UP (ref 0–1)
BILIRUB DIRECT SERPL-MCNC: 0.5 MG/DL — HIGH (ref 0–0.2)
BILIRUB DIRECT SERPL-MCNC: 0.6 MG/DL — HIGH (ref 0–0.2)
BILIRUB INDIRECT FLD-MCNC: 0.5 MG/DL — SIGNIFICANT CHANGE UP (ref 0.2–1.2)
BILIRUB INDIRECT FLD-MCNC: 0.6 MG/DL — SIGNIFICANT CHANGE UP (ref 0.2–1.2)
BILIRUB SERPL-MCNC: 1 MG/DL — SIGNIFICANT CHANGE UP (ref 0.2–1.2)
BILIRUB SERPL-MCNC: 1.2 MG/DL — SIGNIFICANT CHANGE UP (ref 0.2–1.2)
BLD GP AB SCN SERPL QL: SIGNIFICANT CHANGE UP
BUN SERPL-MCNC: 7 MG/DL — LOW (ref 10–20)
BUN SERPL-MCNC: 8 MG/DL — LOW (ref 10–20)
CALCIUM SERPL-MCNC: 8 MG/DL — LOW (ref 8.5–10.1)
CALCIUM SERPL-MCNC: 8 MG/DL — LOW (ref 8.5–10.1)
CHLORIDE SERPL-SCNC: 96 MMOL/L — LOW (ref 98–110)
CHLORIDE SERPL-SCNC: 99 MMOL/L — SIGNIFICANT CHANGE UP (ref 98–110)
CHOLEST SERPL-MCNC: 114 MG/DL — SIGNIFICANT CHANGE UP (ref 100–200)
CO2 SERPL-SCNC: 24 MMOL/L — SIGNIFICANT CHANGE UP (ref 17–32)
CO2 SERPL-SCNC: 25 MMOL/L — SIGNIFICANT CHANGE UP (ref 17–32)
CREAT SERPL-MCNC: 0.6 MG/DL — LOW (ref 0.7–1.5)
CREAT SERPL-MCNC: 0.7 MG/DL — SIGNIFICANT CHANGE UP (ref 0.7–1.5)
EOSINOPHIL # BLD AUTO: 0.06 K/UL — SIGNIFICANT CHANGE UP (ref 0–0.7)
EOSINOPHIL NFR BLD AUTO: 0.6 % — SIGNIFICANT CHANGE UP (ref 0–8)
GLUCOSE SERPL-MCNC: 107 MG/DL — HIGH (ref 70–99)
GLUCOSE SERPL-MCNC: 95 MG/DL — SIGNIFICANT CHANGE UP (ref 70–99)
GRAM STN FLD: SIGNIFICANT CHANGE UP
HCT VFR BLD CALC: 31 % — LOW (ref 42–52)
HCT VFR BLD CALC: 32.3 % — LOW (ref 42–52)
HDLC SERPL-MCNC: 14 MG/DL — LOW (ref 40–125)
HGB BLD-MCNC: 10.5 G/DL — LOW (ref 14–18)
HGB BLD-MCNC: 10.7 G/DL — LOW (ref 14–18)
IMM GRANULOCYTES NFR BLD AUTO: 0.7 % — HIGH (ref 0.1–0.3)
INR BLD: 1.91 RATIO — HIGH (ref 0.65–1.3)
INR BLD: 2 RATIO — HIGH (ref 0.65–1.3)
INR BLD: 2.04 RATIO — HIGH (ref 0.65–1.3)
LIPID PNL WITH DIRECT LDL SERPL: 67 MG/DL — SIGNIFICANT CHANGE UP (ref 4–129)
LYMPHOCYTES # BLD AUTO: 1.09 K/UL — LOW (ref 1.2–3.4)
LYMPHOCYTES # BLD AUTO: 10.3 % — LOW (ref 20.5–51.1)
MAGNESIUM SERPL-MCNC: 1.8 MG/DL — SIGNIFICANT CHANGE UP (ref 1.8–2.4)
MAGNESIUM SERPL-MCNC: 1.9 MG/DL — SIGNIFICANT CHANGE UP (ref 1.8–2.4)
MCHC RBC-ENTMCNC: 30.5 PG — SIGNIFICANT CHANGE UP (ref 27–31)
MCHC RBC-ENTMCNC: 30.9 PG — SIGNIFICANT CHANGE UP (ref 27–31)
MCHC RBC-ENTMCNC: 33.1 G/DL — SIGNIFICANT CHANGE UP (ref 32–37)
MCHC RBC-ENTMCNC: 33.9 G/DL — SIGNIFICANT CHANGE UP (ref 32–37)
MCV RBC AUTO: 91.2 FL — SIGNIFICANT CHANGE UP (ref 80–94)
MCV RBC AUTO: 92 FL — SIGNIFICANT CHANGE UP (ref 80–94)
MONOCYTES # BLD AUTO: 1.22 K/UL — HIGH (ref 0.1–0.6)
MONOCYTES NFR BLD AUTO: 11.5 % — HIGH (ref 1.7–9.3)
NEUTROPHILS # BLD AUTO: 8.12 K/UL — HIGH (ref 1.4–6.5)
NEUTROPHILS NFR BLD AUTO: 76.8 % — HIGH (ref 42.2–75.2)
NRBC # BLD: 0 /100 WBCS — SIGNIFICANT CHANGE UP (ref 0–0)
PHOSPHATE SERPL-MCNC: 3.2 MG/DL — SIGNIFICANT CHANGE UP (ref 2.1–4.9)
PLATELET # BLD AUTO: 266 K/UL — SIGNIFICANT CHANGE UP (ref 130–400)
PLATELET # BLD AUTO: 291 K/UL — SIGNIFICANT CHANGE UP (ref 130–400)
POTASSIUM SERPL-MCNC: 3.6 MMOL/L — SIGNIFICANT CHANGE UP (ref 3.5–5)
POTASSIUM SERPL-MCNC: 3.8 MMOL/L — SIGNIFICANT CHANGE UP (ref 3.5–5)
POTASSIUM SERPL-SCNC: 3.6 MMOL/L — SIGNIFICANT CHANGE UP (ref 3.5–5)
POTASSIUM SERPL-SCNC: 3.8 MMOL/L — SIGNIFICANT CHANGE UP (ref 3.5–5)
PROT SERPL-MCNC: 5.8 G/DL — LOW (ref 6–8)
PROT SERPL-MCNC: 5.8 G/DL — LOW (ref 6–8)
PROTHROM AB SERPL-ACNC: 20.5 SEC — HIGH (ref 9.95–12.87)
PROTHROM AB SERPL-ACNC: 21.4 SEC — HIGH (ref 9.95–12.87)
PROTHROM AB SERPL-ACNC: 21.9 SEC — HIGH (ref 9.95–12.87)
RBC # BLD: 3.4 M/UL — LOW (ref 4.7–6.1)
RBC # BLD: 3.51 M/UL — LOW (ref 4.7–6.1)
RBC # FLD: 12 % — SIGNIFICANT CHANGE UP (ref 11.5–14.5)
RBC # FLD: 12.2 % — SIGNIFICANT CHANGE UP (ref 11.5–14.5)
SODIUM SERPL-SCNC: 136 MMOL/L — SIGNIFICANT CHANGE UP (ref 135–146)
SODIUM SERPL-SCNC: 138 MMOL/L — SIGNIFICANT CHANGE UP (ref 135–146)
SPECIMEN SOURCE: SIGNIFICANT CHANGE UP
TOTAL CHOLESTEROL/HDL RATIO MEASUREMENT: 8.1 RATIO — HIGH (ref 4–5.5)
TRIGL SERPL-MCNC: 115 MG/DL — SIGNIFICANT CHANGE UP (ref 10–149)
TYPE + AB SCN PNL BLD: SIGNIFICANT CHANGE UP
TYPE + AB SCN PNL BLD: SIGNIFICANT CHANGE UP
WBC # BLD: 10.3 K/UL — SIGNIFICANT CHANGE UP (ref 4.8–10.8)
WBC # BLD: 10.57 K/UL — SIGNIFICANT CHANGE UP (ref 4.8–10.8)
WBC # FLD AUTO: 10.3 K/UL — SIGNIFICANT CHANGE UP (ref 4.8–10.8)
WBC # FLD AUTO: 10.57 K/UL — SIGNIFICANT CHANGE UP (ref 4.8–10.8)

## 2018-07-01 RX ORDER — ACETAMINOPHEN 500 MG
650 TABLET ORAL EVERY 6 HOURS
Qty: 0 | Refills: 0 | Status: DISCONTINUED | OUTPATIENT
Start: 2018-07-01 | End: 2018-07-02

## 2018-07-01 RX ADMIN — ENOXAPARIN SODIUM 40 MILLIGRAM(S): 100 INJECTION SUBCUTANEOUS at 12:05

## 2018-07-01 RX ADMIN — Medication 600 MILLIGRAM(S): at 21:50

## 2018-07-01 RX ADMIN — OXYCODONE HYDROCHLORIDE 5 MILLIGRAM(S): 5 TABLET ORAL at 15:30

## 2018-07-01 RX ADMIN — Medication 650 MILLIGRAM(S): at 10:40

## 2018-07-01 RX ADMIN — OXYCODONE HYDROCHLORIDE 5 MILLIGRAM(S): 5 TABLET ORAL at 00:26

## 2018-07-01 RX ADMIN — Medication 600 MILLIGRAM(S): at 05:10

## 2018-07-01 RX ADMIN — Medication 650 MILLIGRAM(S): at 10:10

## 2018-07-01 RX ADMIN — Medication 650 MILLIGRAM(S): at 20:39

## 2018-07-01 RX ADMIN — Medication 100 MILLIGRAM(S): at 21:51

## 2018-07-01 RX ADMIN — CEFEPIME 100 MILLIGRAM(S): 1 INJECTION, POWDER, FOR SOLUTION INTRAMUSCULAR; INTRAVENOUS at 22:38

## 2018-07-01 RX ADMIN — OXYCODONE HYDROCHLORIDE 5 MILLIGRAM(S): 5 TABLET ORAL at 15:00

## 2018-07-01 RX ADMIN — Medication 600 MILLIGRAM(S): at 15:30

## 2018-07-01 RX ADMIN — Medication 100 MILLIGRAM(S): at 05:09

## 2018-07-01 RX ADMIN — SODIUM CHLORIDE 100 MILLILITER(S): 9 INJECTION INTRAMUSCULAR; INTRAVENOUS; SUBCUTANEOUS at 08:00

## 2018-07-01 RX ADMIN — SODIUM CHLORIDE 100 MILLILITER(S): 9 INJECTION INTRAMUSCULAR; INTRAVENOUS; SUBCUTANEOUS at 14:52

## 2018-07-01 RX ADMIN — OXYCODONE HYDROCHLORIDE 10 MILLIGRAM(S): 5 TABLET ORAL at 22:37

## 2018-07-01 RX ADMIN — OXYCODONE HYDROCHLORIDE 10 MILLIGRAM(S): 5 TABLET ORAL at 04:15

## 2018-07-01 RX ADMIN — Medication 600 MILLIGRAM(S): at 14:52

## 2018-07-01 RX ADMIN — CEFEPIME 100 MILLIGRAM(S): 1 INJECTION, POWDER, FOR SOLUTION INTRAMUSCULAR; INTRAVENOUS at 06:00

## 2018-07-01 RX ADMIN — CEFEPIME 100 MILLIGRAM(S): 1 INJECTION, POWDER, FOR SOLUTION INTRAMUSCULAR; INTRAVENOUS at 14:53

## 2018-07-01 RX ADMIN — Medication 100 MILLIGRAM(S): at 14:53

## 2018-07-01 RX ADMIN — Medication 650 MILLIGRAM(S): at 04:27

## 2018-07-01 RX ADMIN — SODIUM CHLORIDE 100 MILLILITER(S): 9 INJECTION INTRAMUSCULAR; INTRAVENOUS; SUBCUTANEOUS at 02:08

## 2018-07-01 NOTE — PROGRESS NOTE ADULT - ASSESSMENT
22 y/o M with no previous PMH presented with progressive productive cough with green sputum, persistent fever up to 103F, and red, swollen, purulent discharge from his tonsils for past 2 weeks not improved with outpatient course of amoxicillin and.    1-Sepsis 2/2 Pneumonia with Loculated Left Sided Pleural Effusion  - atbx changed to Levaquin, clindamycin and cefepime as per ID recs  -nasal MRSA is negative  -CT surgery : VATS decortication Monday 7/2    2-DVT prophylaxis: lovenox 40 mg s/c  NPO after midnight  Pager No. [phone]

## 2018-07-01 NOTE — PROGRESS NOTE ADULT - SUBJECTIVE AND OBJECTIVE BOX
TAMI JOHNSON  23y, Male      OVERNIGHT EVENTS:  none      VITALS:  T(F): 98.7, Max: 102.9 (18 @ 05:01)  HR: 94  BP: 154/95  RR: 18Vital Signs Last 24 Hrs  T(C): 37.1 (2018 09:16), Max: 39.4 (2018 05:01)  T(F): 98.7 (2018 09:16), Max: 102.9 (2018 05:01)  HR: 94 (2018 07:42) (83 - 113)  BP: 154/95 (2018 05:01) (111/69 - 154/95)  BP(mean): --  RR: 18 (2018 05:01) (18 - 20)  SpO2: 95% (2018 09:16) (90% - 96%)    TESTS & MEASUREMENTS:                        10.5   10.30 )-----------( 266      ( 2018 01:16 )             31.0         138  |  99  |  8<L>  ----------------------------<  107<H>  3.6   |  24  |  0.6<L>    Ca    8.0<L>      2018 01:16  Phos  3.2       Mg     1.9         TPro  5.8<L>  /  Alb  2.9<L>  /  TBili  1.0  /  DBili  0.5<H>  /  AST  44<H>  /  ALT  58<H>  /  AlkPhos  95      LIVER FUNCTIONS - ( 2018 01:16 )  Alb: 2.9 g/dL / Pro: 5.8 g/dL / ALK PHOS: 95 U/L / ALT: 58 U/L / AST: 44 U/L / GGT: x             Culture - Blood (collected 18 @ 20:43)  Source: .Blood None  Preliminary Report (18 @ 03:01):    No growth to date.    Culture - Blood (collected 18 @ 20:43)  Source: .Blood None  Preliminary Report (18 @ 03:01):    No growth to date.    Culture - Blood (collected 18 @ 11:26)  Source: .Blood Blood  Preliminary Report (18 @ 01:02):    No growth to date.    Culture - Blood (collected 18 @ 11:26)  Source: .Blood Blood  Preliminary Report (18 @ 01:02):    No growth to date.      Urinalysis Basic - ( 2018 21:50 )    Color: Dark Yellow / Appearance: Cloudy / S.020 / pH: x  Gluc: x / Ketone: Negative  / Bili: Negative / Urobili: 2.0 mg/dL   Blood: x / Protein: Trace mg/dL / Nitrite: Negative   Leuk Esterase: Negative / RBC: 1-2 /HPF / WBC x   Sq Epi: x / Non Sq Epi: x / Bacteria: x          RADIOLOGY & ADDITIONAL TESTS:    ANTIBIOTICS:  cefepime   IVPB 2000 milliGRAM(s) IV Intermittent every 8 hours  cefepime   IVPB      clindamycin IVPB      clindamycin IVPB 900 milliGRAM(s) IV Intermittent every 8 hours  levoFLOXacin IVPB      levoFLOXacin IVPB 750 milliGRAM(s) IV Intermittent every 24 hours

## 2018-07-01 NOTE — PROGRESS NOTE ADULT - SUBJECTIVE AND OBJECTIVE BOX
SUBJECTIVE: Patinet examined at bedside. No new complaints.      REVIEW OF SYSTEMS:  See HPI    PHYSICAL EXAM  Vital Signs Last 24 Hrs  T(C): 37.1 (2018 09:16), Max: 39.4 (2018 05:01)  T(F): 98.7 (2018 09:16), Max: 102.9 (2018 05:01)  HR: 94 (2018 07:42) (83 - 113)  BP: 154/95 (2018 05:01) (111/69 - 154/95)  BP(mean): --  RR: 18 (2018 05:01) (18 - 20)  SpO2: 95% (2018 09:16) (90% - 96%)    General: AAO x 3  HEENT: NAD          Lymph Nodes: NO lymphadenopathy  Neck:  Supple  Lungs: Rhonchi on left side.  Cardiovascular: S1S2  Abdomen: Soft, non tender  Extremities: NO edema or cyanosis  Skin: Intact  neuro: non focal    LABS:                          10.7   10.57 )-----------( 291      ( 2018 07:49 )             32.3                                               07-01    138  |  99  |  8<L>  ----------------------------<  107<H>  3.6   |  24  |  0.6<L>    Ca    8.0<L>      2018 01:16  Phos  3.2       Mg     1.9         TPro  5.8<L>  /  Alb  2.9<L>  /  TBili  1.0  /  DBili  0.5<H>  /  AST  44<H>  /  ALT  58<H>  /  AlkPhos  95  07-      PT/INR - ( 2018 01:16 )   PT: 21.40 sec;   INR: 2.00 ratio         PTT - ( 2018 01:16 )  PTT:30.4 sec                                       Urinalysis Basic - ( 2018 21:50 )    Color: Dark Yellow / Appearance: Cloudy / S.020 / pH: x  Gluc: x / Ketone: Negative  / Bili: Negative / Urobili: 2.0 mg/dL   Blood: x / Protein: Trace mg/dL / Nitrite: Negative   Leuk Esterase: Negative / RBC: 1-2 /HPF / WBC x   Sq Epi: x / Non Sq Epi: x / Bacteria: x                                            LIVER FUNCTIONS - ( 2018 01:16 )  Alb: 2.9 g/dL / Pro: 5.8 g/dL / ALK PHOS: 95 U/L / ALT: 58 U/L / AST: 44 U/L / GGT: x                                                  Culture - Blood (collected 2018 20:43)  Source: .Blood None  Preliminary Report (2018 03:01):    No growth to date.    Culture - Blood (collected 2018 20:43)  Source: .Blood None  Preliminary Report (2018 03:01):    No growth to date.    Culture - Blood (collected 2018 11:26)  Source: .Blood Blood  Preliminary Report (2018 01:02):    No growth to date.    Culture - Blood (collected 2018 11:26)  Source: .Blood Blood  Preliminary Report (2018 01:02):    No growth to date.    MEDICATIONS  (STANDING):  cefepime   IVPB 2000 milliGRAM(s) IV Intermittent every 8 hours  cefepime   IVPB      clindamycin IVPB      clindamycin IVPB 900 milliGRAM(s) IV Intermittent every 8 hours  enoxaparin Injectable 40 milliGRAM(s) SubCutaneous daily  ibuprofen  Tablet 600 milliGRAM(s) Oral every 8 hours  levoFLOXacin IVPB      levoFLOXacin IVPB 750 milliGRAM(s) IV Intermittent every 24 hours  sodium chloride 0.9%. 1000 milliLiter(s) (100 mL/Hr) IV Continuous <Continuous>    MEDICATIONS  (PRN):  acetaminophen   Tablet 650 milliGRAM(s) Oral every 6 hours PRN For Temp greater than 38 C (100.4 F)  acetaminophen   Tablet. 650 milliGRAM(s) Oral every 6 hours PRN Moderate Pain (4 - 6)  oxyCODONE    IR 10 milliGRAM(s) Oral every 4 hours PRN Severe Pain (7 - 10)  oxyCODONE    IR 5 milliGRAM(s) Oral every 4 hours PRN Moderate Pain (4 - 6)

## 2018-07-01 NOTE — PROGRESS NOTE ADULT - ASSESSMENT
IMPRESSION:  Sepsis secondary to LLL bacterial PNA with empyema.  Blood cultures negative to date    RECOMMENDATIONS:   Urine for legionella antigen.  Decortication planned.  Levofloxacin 750 mg iv q24h  Clindamycin 900 mg iv q8h  Cefepime 2 gm iv q8h.

## 2018-07-01 NOTE — PROGRESS NOTE ADULT - SUBJECTIVE AND OBJECTIVE BOX
Chief Complaint:  Patient is a 23y old  Male who presents with a chief complaint of Left sided pneumonia (2018 14:27)      Interval Events:     Allergies:  No Known Allergies      Home Medications:    Hospital Medications:  acetaminophen   Tablet 650 milliGRAM(s) Oral every 6 hours PRN  acetaminophen   Tablet. 650 milliGRAM(s) Oral every 6 hours PRN  cefepime   IVPB 2000 milliGRAM(s) IV Intermittent every 8 hours  cefepime   IVPB      clindamycin IVPB      clindamycin IVPB 900 milliGRAM(s) IV Intermittent every 8 hours  enoxaparin Injectable 40 milliGRAM(s) SubCutaneous daily  ibuprofen  Tablet 600 milliGRAM(s) Oral every 8 hours  levoFLOXacin IVPB      levoFLOXacin IVPB 750 milliGRAM(s) IV Intermittent every 24 hours  oxyCODONE    IR 10 milliGRAM(s) Oral every 4 hours PRN  oxyCODONE    IR 5 milliGRAM(s) Oral every 4 hours PRN  sodium chloride 0.9%. 1000 milliLiter(s) IV Continuous <Continuous>      PMHX/PSHX:  No pertinent past medical history  No significant past surgical history      Family history:  Family history of pneumonia  Family history of hypertension (Father, Mother)  Family history of leukemia (Father)      ROS:   As mentioned below      PHYSICAL EXAM:   Vital Signs:  Vital Signs Last 24 Hrs  T(C): 36.3 (2018 12:24), Max: 39.4 (2018 05:01)  T(F): 97.4 (2018 12:24), Max: 102.9 (2018 05:01)  HR: 86 (2018 12:24) (83 - 110)  BP: 119/58 (2018 12:24) (111/69 - 154/95)  BP(mean): --  RR: 18 (2018 12:24) (18 - 20)  SpO2: 95% (2018 09:16) (90% - 96%)  Daily     Daily     GENERAL:  NAD  HEENT:  NC/AT,  No Thyromegaly  CHEST:  CTA B/L  HEART:  S1, S2- No M, R, G  ABDOMEN:  Soft, NT, ND  EXTEREMITIES:  no cyanosis,clubbing or edema  SKIN:  NAD  NEURO:  Grossly Nr.    LABS:                        10.7   10.57 )-----------( 291      ( 2018 07:49 )             32.3     -    136  |  96<L>  |  7<L>  ----------------------------<  95  3.8   |  25  |  0.7    Ca    8.0<L>      2018 07:49  Phos  3.2       Mg     1.8         TPro  5.8<L>  /  Alb  2.7<L>  /  TBili  1.2  /  DBili  0.6<H>  /  AST  40  /  ALT  63<H>  /  AlkPhos  98      LIVER FUNCTIONS - ( 2018 07:49 )  Alb: 2.7 g/dL / Pro: 5.8 g/dL / ALK PHOS: 98 U/L / ALT: 63 U/L / AST: 40 U/L / GGT: x           PT/INR - ( 2018 07:49 )   PT: 21.90 sec;   INR: 2.04 ratio         PTT - ( 2018 07:49 )  PTT:29.9 sec  Urinalysis Basic - ( 2018 21:50 )    Color: Dark Yellow / Appearance: Cloudy / S.020 / pH: x  Gluc: x / Ketone: Negative  / Bili: Negative / Urobili: 2.0 mg/dL   Blood: x / Protein: Trace mg/dL / Nitrite: Negative   Leuk Esterase: Negative / RBC: 1-2 /HPF / WBC x   Sq Epi: x / Non Sq Epi: x / Bacteria: x          Imaging:

## 2018-07-01 NOTE — PROGRESS NOTE ADULT - ASSESSMENT
Impression:  Left lower lobe pneumonia with parapneumonic effusion    Recommendations:  Continue with antibiotics as per ID  Follow CT surgery for  VATS decortication tomorrow  DVT prophylaxis  Out of bed to chair.

## 2018-07-02 ENCOUNTER — RESULT REVIEW (OUTPATIENT)
Age: 23
End: 2018-07-02

## 2018-07-02 LAB
ALBUMIN SERPL ELPH-MCNC: 2.6 G/DL — LOW (ref 3.5–5.2)
ALP SERPL-CCNC: 81 U/L — SIGNIFICANT CHANGE UP (ref 30–115)
ALT FLD-CCNC: 43 U/L — HIGH (ref 0–41)
ANION GAP SERPL CALC-SCNC: 13 MMOL/L — SIGNIFICANT CHANGE UP (ref 7–14)
ANION GAP SERPL CALC-SCNC: 15 MMOL/L — HIGH (ref 7–14)
APTT BLD: 30.7 SEC — SIGNIFICANT CHANGE UP (ref 27–39.2)
APTT BLD: 30.7 SEC — SIGNIFICANT CHANGE UP (ref 27–39.2)
AST SERPL-CCNC: 19 U/L — SIGNIFICANT CHANGE UP (ref 0–41)
BASOPHILS # BLD AUTO: 0.01 K/UL — SIGNIFICANT CHANGE UP (ref 0–0.2)
BASOPHILS # BLD AUTO: 0.02 K/UL — SIGNIFICANT CHANGE UP (ref 0–0.2)
BASOPHILS NFR BLD AUTO: 0.1 % — SIGNIFICANT CHANGE UP (ref 0–1)
BASOPHILS NFR BLD AUTO: 0.2 % — SIGNIFICANT CHANGE UP (ref 0–1)
BILIRUB DIRECT SERPL-MCNC: 0.5 MG/DL — HIGH (ref 0–0.2)
BILIRUB INDIRECT FLD-MCNC: 0.5 MG/DL — SIGNIFICANT CHANGE UP (ref 0.2–1.2)
BILIRUB SERPL-MCNC: 1 MG/DL — SIGNIFICANT CHANGE UP (ref 0.2–1.2)
BUN SERPL-MCNC: 7 MG/DL — LOW (ref 10–20)
BUN SERPL-MCNC: 7 MG/DL — LOW (ref 10–20)
CALCIUM SERPL-MCNC: 8.1 MG/DL — LOW (ref 8.5–10.1)
CALCIUM SERPL-MCNC: 8.5 MG/DL — SIGNIFICANT CHANGE UP (ref 8.5–10.1)
CHLORIDE SERPL-SCNC: 96 MMOL/L — LOW (ref 98–110)
CHLORIDE SERPL-SCNC: 99 MMOL/L — SIGNIFICANT CHANGE UP (ref 98–110)
CO2 SERPL-SCNC: 24 MMOL/L — SIGNIFICANT CHANGE UP (ref 17–32)
CO2 SERPL-SCNC: 25 MMOL/L — SIGNIFICANT CHANGE UP (ref 17–32)
CREAT SERPL-MCNC: 0.6 MG/DL — LOW (ref 0.7–1.5)
CREAT SERPL-MCNC: <0.5 MG/DL — LOW (ref 0.7–1.5)
EOSINOPHIL # BLD AUTO: 0 K/UL — SIGNIFICANT CHANGE UP (ref 0–0.7)
EOSINOPHIL # BLD AUTO: 0.05 K/UL — SIGNIFICANT CHANGE UP (ref 0–0.7)
EOSINOPHIL NFR BLD AUTO: 0 % — SIGNIFICANT CHANGE UP (ref 0–8)
EOSINOPHIL NFR BLD AUTO: 0.5 % — SIGNIFICANT CHANGE UP (ref 0–8)
GLUCOSE SERPL-MCNC: 144 MG/DL — HIGH (ref 70–99)
GLUCOSE SERPL-MCNC: 86 MG/DL — SIGNIFICANT CHANGE UP (ref 70–99)
HCT VFR BLD CALC: 30.9 % — LOW (ref 42–52)
HCT VFR BLD CALC: 31.6 % — LOW (ref 42–52)
HGB BLD-MCNC: 10.4 G/DL — LOW (ref 14–18)
HGB BLD-MCNC: 10.8 G/DL — LOW (ref 14–18)
IMM GRANULOCYTES NFR BLD AUTO: 0.4 % — HIGH (ref 0.1–0.3)
IMM GRANULOCYTES NFR BLD AUTO: 0.7 % — HIGH (ref 0.1–0.3)
INR BLD: 1.63 RATIO — HIGH (ref 0.65–1.3)
INR BLD: 1.81 RATIO — HIGH (ref 0.65–1.3)
LEGIONELLA AG UR QL: NEGATIVE — SIGNIFICANT CHANGE UP
LYMPHOCYTES # BLD AUTO: 0.45 K/UL — LOW (ref 1.2–3.4)
LYMPHOCYTES # BLD AUTO: 1.15 K/UL — LOW (ref 1.2–3.4)
LYMPHOCYTES # BLD AUTO: 12.3 % — LOW (ref 20.5–51.1)
LYMPHOCYTES # BLD AUTO: 3.9 % — LOW (ref 20.5–51.1)
MAGNESIUM SERPL-MCNC: 1.8 MG/DL — SIGNIFICANT CHANGE UP (ref 1.8–2.4)
MCHC RBC-ENTMCNC: 30.4 PG — SIGNIFICANT CHANGE UP (ref 27–31)
MCHC RBC-ENTMCNC: 31.1 PG — HIGH (ref 27–31)
MCHC RBC-ENTMCNC: 33.7 G/DL — SIGNIFICANT CHANGE UP (ref 32–37)
MCHC RBC-ENTMCNC: 34.2 G/DL — SIGNIFICANT CHANGE UP (ref 32–37)
MCV RBC AUTO: 90.4 FL — SIGNIFICANT CHANGE UP (ref 80–94)
MCV RBC AUTO: 91.1 FL — SIGNIFICANT CHANGE UP (ref 80–94)
MONOCYTES # BLD AUTO: 0.39 K/UL — SIGNIFICANT CHANGE UP (ref 0.1–0.6)
MONOCYTES # BLD AUTO: 0.78 K/UL — HIGH (ref 0.1–0.6)
MONOCYTES NFR BLD AUTO: 3.4 % — SIGNIFICANT CHANGE UP (ref 1.7–9.3)
MONOCYTES NFR BLD AUTO: 8.3 % — SIGNIFICANT CHANGE UP (ref 1.7–9.3)
NEUTROPHILS # BLD AUTO: 10.52 K/UL — HIGH (ref 1.4–6.5)
NEUTROPHILS # BLD AUTO: 7.33 K/UL — HIGH (ref 1.4–6.5)
NEUTROPHILS NFR BLD AUTO: 78.3 % — HIGH (ref 42.2–75.2)
NEUTROPHILS NFR BLD AUTO: 91.9 % — HIGH (ref 42.2–75.2)
NRBC # BLD: 0 /100 WBCS — SIGNIFICANT CHANGE UP (ref 0–0)
PLATELET # BLD AUTO: 304 K/UL — SIGNIFICANT CHANGE UP (ref 130–400)
PLATELET # BLD AUTO: 309 K/UL — SIGNIFICANT CHANGE UP (ref 130–400)
POTASSIUM SERPL-MCNC: 3.8 MMOL/L — SIGNIFICANT CHANGE UP (ref 3.5–5)
POTASSIUM SERPL-MCNC: 4.2 MMOL/L — SIGNIFICANT CHANGE UP (ref 3.5–5)
POTASSIUM SERPL-SCNC: 3.8 MMOL/L — SIGNIFICANT CHANGE UP (ref 3.5–5)
POTASSIUM SERPL-SCNC: 4.2 MMOL/L — SIGNIFICANT CHANGE UP (ref 3.5–5)
PROT SERPL-MCNC: 5.7 G/DL — LOW (ref 6–8)
PROTHROM AB SERPL-ACNC: 17.5 SEC — HIGH (ref 9.95–12.87)
PROTHROM AB SERPL-ACNC: 19.4 SEC — HIGH (ref 9.95–12.87)
RBC # BLD: 3.42 M/UL — LOW (ref 4.7–6.1)
RBC # BLD: 3.47 M/UL — LOW (ref 4.7–6.1)
RBC # FLD: 12.4 % — SIGNIFICANT CHANGE UP (ref 11.5–14.5)
RBC # FLD: 12.4 % — SIGNIFICANT CHANGE UP (ref 11.5–14.5)
SODIUM SERPL-SCNC: 136 MMOL/L — SIGNIFICANT CHANGE UP (ref 135–146)
SODIUM SERPL-SCNC: 136 MMOL/L — SIGNIFICANT CHANGE UP (ref 135–146)
WBC # BLD: 11.45 K/UL — HIGH (ref 4.8–10.8)
WBC # BLD: 9.37 K/UL — SIGNIFICANT CHANGE UP (ref 4.8–10.8)
WBC # FLD AUTO: 11.45 K/UL — HIGH (ref 4.8–10.8)
WBC # FLD AUTO: 9.37 K/UL — SIGNIFICANT CHANGE UP (ref 4.8–10.8)

## 2018-07-02 PROCEDURE — 99291 CRITICAL CARE FIRST HOUR: CPT

## 2018-07-02 RX ORDER — CEFEPIME 1 G/1
2000 INJECTION, POWDER, FOR SOLUTION INTRAMUSCULAR; INTRAVENOUS EVERY 8 HOURS
Qty: 0 | Refills: 0 | Status: DISCONTINUED | OUTPATIENT
Start: 2018-07-02 | End: 2018-07-05

## 2018-07-02 RX ORDER — VANCOMYCIN HCL 1 G
1500 VIAL (EA) INTRAVENOUS EVERY 12 HOURS
Qty: 0 | Refills: 0 | Status: DISCONTINUED | OUTPATIENT
Start: 2018-07-02 | End: 2018-07-02

## 2018-07-02 RX ORDER — HEPARIN SODIUM 5000 [USP'U]/ML
5000 INJECTION INTRAVENOUS; SUBCUTANEOUS EVERY 12 HOURS
Qty: 0 | Refills: 0 | Status: DISCONTINUED | OUTPATIENT
Start: 2018-07-02 | End: 2018-07-08

## 2018-07-02 RX ORDER — HYDROMORPHONE HYDROCHLORIDE 2 MG/ML
30 INJECTION INTRAMUSCULAR; INTRAVENOUS; SUBCUTANEOUS
Qty: 0 | Refills: 0 | Status: DISCONTINUED | OUTPATIENT
Start: 2018-07-02 | End: 2018-07-07

## 2018-07-02 RX ORDER — NALOXONE HYDROCHLORIDE 4 MG/.1ML
0.1 SPRAY NASAL
Qty: 0 | Refills: 0 | Status: DISCONTINUED | OUTPATIENT
Start: 2018-07-02 | End: 2018-07-08

## 2018-07-02 RX ORDER — SODIUM CHLORIDE 9 MG/ML
1000 INJECTION, SOLUTION INTRAVENOUS
Qty: 0 | Refills: 0 | Status: DISCONTINUED | OUTPATIENT
Start: 2018-07-02 | End: 2018-07-03

## 2018-07-02 RX ORDER — ONDANSETRON 8 MG/1
4 TABLET, FILM COATED ORAL EVERY 6 HOURS
Qty: 0 | Refills: 0 | Status: DISCONTINUED | OUTPATIENT
Start: 2018-07-02 | End: 2018-07-08

## 2018-07-02 RX ORDER — ONDANSETRON 8 MG/1
4 TABLET, FILM COATED ORAL ONCE
Qty: 0 | Refills: 0 | Status: DISCONTINUED | OUTPATIENT
Start: 2018-07-02 | End: 2018-07-03

## 2018-07-02 RX ORDER — SODIUM CHLORIDE 9 MG/ML
1000 INJECTION, SOLUTION INTRAVENOUS
Qty: 0 | Refills: 0 | Status: DISCONTINUED | OUTPATIENT
Start: 2018-07-02 | End: 2018-07-02

## 2018-07-02 RX ORDER — HYDROMORPHONE HYDROCHLORIDE 2 MG/ML
1 INJECTION INTRAMUSCULAR; INTRAVENOUS; SUBCUTANEOUS
Qty: 0 | Refills: 0 | Status: DISCONTINUED | OUTPATIENT
Start: 2018-07-02 | End: 2018-07-03

## 2018-07-02 RX ORDER — ACETAMINOPHEN 500 MG
650 TABLET ORAL EVERY 6 HOURS
Qty: 0 | Refills: 0 | Status: DISCONTINUED | OUTPATIENT
Start: 2018-07-02 | End: 2018-07-08

## 2018-07-02 RX ADMIN — Medication 600 MILLIGRAM(S): at 05:52

## 2018-07-02 RX ADMIN — HYDROMORPHONE HYDROCHLORIDE 30 MILLILITER(S): 2 INJECTION INTRAMUSCULAR; INTRAVENOUS; SUBCUTANEOUS at 12:59

## 2018-07-02 RX ADMIN — Medication 100 MILLIGRAM(S): at 15:06

## 2018-07-02 RX ADMIN — Medication 600 MILLIGRAM(S): at 00:50

## 2018-07-02 RX ADMIN — HEPARIN SODIUM 5000 UNIT(S): 5000 INJECTION INTRAVENOUS; SUBCUTANEOUS at 21:06

## 2018-07-02 RX ADMIN — Medication 100 MILLIGRAM(S): at 05:48

## 2018-07-02 RX ADMIN — OXYCODONE HYDROCHLORIDE 10 MILLIGRAM(S): 5 TABLET ORAL at 05:46

## 2018-07-02 RX ADMIN — OXYCODONE HYDROCHLORIDE 10 MILLIGRAM(S): 5 TABLET ORAL at 00:50

## 2018-07-02 RX ADMIN — SODIUM CHLORIDE 50 MILLILITER(S): 9 INJECTION, SOLUTION INTRAVENOUS at 14:52

## 2018-07-02 RX ADMIN — OXYCODONE HYDROCHLORIDE 10 MILLIGRAM(S): 5 TABLET ORAL at 07:27

## 2018-07-02 RX ADMIN — Medication 100 MILLIGRAM(S): at 21:06

## 2018-07-02 RX ADMIN — CEFEPIME 100 MILLIGRAM(S): 1 INJECTION, POWDER, FOR SOLUTION INTRAMUSCULAR; INTRAVENOUS at 06:46

## 2018-07-02 RX ADMIN — SODIUM CHLORIDE 100 MILLILITER(S): 9 INJECTION INTRAMUSCULAR; INTRAVENOUS; SUBCUTANEOUS at 03:01

## 2018-07-02 RX ADMIN — CEFEPIME 100 MILLIGRAM(S): 1 INJECTION, POWDER, FOR SOLUTION INTRAMUSCULAR; INTRAVENOUS at 14:28

## 2018-07-02 RX ADMIN — CEFEPIME 100 MILLIGRAM(S): 1 INJECTION, POWDER, FOR SOLUTION INTRAMUSCULAR; INTRAVENOUS at 21:05

## 2018-07-02 NOTE — PROGRESS NOTE ADULT - SUBJECTIVE AND OBJECTIVE BOX
TAMI JOHNSON  23y, Male      OVERNIGHT EVENTS:  pain is less    VITALS:  T(F): 100, Max: 101.1 (18 @ 20:30)  HR: 88  BP: 118/70  RR: 18Vital Signs Last 24 Hrs  T(C): 37.8 (2018 06:53), Max: 38.4 (2018 20:30)  T(F): 100 (2018 06:53), Max: 101.1 (2018 20:30)  HR: 88 (2018 04:56) (86 - 91)  BP: 118/70 (2018 04:56) (118/70 - 135/82)  BP(mean): --  RR: 18 (2018 04:56) (18 - 18)  SpO2: 96% (2018 02:30) (95% - 97%)    TESTS & MEASUREMENTS:                        10.7   10.57 )-----------( 291      ( 2018 07:49 )             32.3         136  |  96<L>  |  7<L>  ----------------------------<  95  3.8   |  25  |  0.7    Ca    8.0<L>      2018 07:49  Phos  3.2       Mg     1.8         TPro  5.8<L>  /  Alb  2.7<L>  /  TBili  1.2  /  DBili  0.6<H>  /  AST  40  /  ALT  63<H>  /  AlkPhos  98      LIVER FUNCTIONS - ( 2018 07:49 )  Alb: 2.7 g/dL / Pro: 5.8 g/dL / ALK PHOS: 98 U/L / ALT: 63 U/L / AST: 40 U/L / GGT: x             Culture - Blood (collected 18 @ 06:17)  Source: .Blood None  Preliminary Report (18 @ 16:01):    No growth to date.    Culture - Blood (collected 18 @ 06:17)  Source: .Blood None  Preliminary Report (18 @ 16:01):    No growth to date.    Culture - Sputum (collected 18 @ 23:46)  Source: .Sputum Sputum  Gram Stain (18 @ 20:56):    Numerous polymorphonuclear leukocytes per low power field    Few Squamous epithelial cells per low power field    Rare Gram Positive Cocci in Clusters per oil power field    Culture - Blood (collected 18 @ 20:43)  Source: .Blood None  Preliminary Report (18 @ 03:01):    No growth to date.    Culture - Blood (collected 18 @ 20:43)  Source: .Blood None  Preliminary Report (18 @ 03:01):    No growth to date.    Culture - Blood (collected 18 @ 11:26)  Source: .Blood Blood  Preliminary Report (18 @ 01:02):    No growth to date.    Culture - Blood (collected 18 @ 11:26)  Source: .Blood Blood  Preliminary Report (18 @ 01:02):    No growth to date.      Urinalysis Basic - ( 2018 21:50 )    Color: Dark Yellow / Appearance: Cloudy / S.020 / pH: x  Gluc: x / Ketone: Negative  / Bili: Negative / Urobili: 2.0 mg/dL   Blood: x / Protein: Trace mg/dL / Nitrite: Negative   Leuk Esterase: Negative / RBC: 1-2 /HPF / WBC x   Sq Epi: x / Non Sq Epi: x / Bacteria: x          RADIOLOGY & ADDITIONAL TESTS:    ANTIBIOTICS:  cefepime   IVPB 2000 milliGRAM(s) IV Intermittent every 8 hours  cefepime   IVPB      clindamycin IVPB      clindamycin IVPB 900 milliGRAM(s) IV Intermittent every 8 hours  levoFLOXacin IVPB      levoFLOXacin IVPB 750 milliGRAM(s) IV Intermittent every 24 hours

## 2018-07-02 NOTE — PRE-ANESTHESIA EVALUATION ADULT - NSANTHADDINFOFT_GEN_ALL_CORE
General anesthesia with A-line +/- need for blood products. discussed with the patient all the risks, benefits, alternatives, complications. all questions answered. willing to proceed

## 2018-07-02 NOTE — CONSULT NOTE ADULT - ATTENDING COMMENTS
24 y/o M with no previous PMH presented with multiloculated pleural effusion in the setting of recent upper airway infection, fairly rapid evolution of thoracic pathology.    PLAN:  - 10mg Vit K to normalize INR. repeat labs at 23:00.   - Preop for OR. Scheduled for VATS decortication Monday 7/2.
s/p VATS   CTx2 in place   continue pain control   IVL  admit to SICU

## 2018-07-02 NOTE — PROGRESS NOTE ADULT - SUBJECTIVE AND OBJECTIVE BOX
Mr Sabillon is a 24 yo M s/p VATS decortication on L chest. Patient is doing well post procedure he denies fever, chills, n/v, SOB, Chest pain.     PE:   Gen: NAD. Laying in bed with family at side  Neuro: aaox3  Chest: Pleurex in place on L chest placed to suction. dressing c/d/i    Vital Signs Last 24 Hrs  T(C): 37.1 (02 Jul 2018 14:00), Max: 38.4 (01 Jul 2018 20:30)  T(F): 98.7 (02 Jul 2018 14:00), Max: 101.1 (01 Jul 2018 20:30)  HR: 88 (02 Jul 2018 17:00) (69 - 94)  BP: 118/80 (02 Jul 2018 14:00) (116/68 - 135/82)  BP(mean): --  RR: 16 (02 Jul 2018 17:00) (15 - 23)  SpO2: 97% (02 Jul 2018 17:00) (95% - 99%) TAMI JOHNSON  [id number]  23y Male    Indication for ICU admission:  Admit Date:06-29-18  ICU Date: 7/2/18  OR Date: 7/2/18    Mr. Johnson is a 24 yo M POD1 s/p VATS decortication on L chest. Patient is doing well post procedure he denies fever, chills, n/v, SOB, Chest pain.     No Known Allergies    PAST MEDICAL & SURGICAL HISTORY:  No pertinent past medical history  No significant past surgical history    Home Medications: None    24HRS EVENT:    Neuro: Alert and oriented x3. Denies any pain on Dilaudid PCA.     Cards: Normotensive     Pulmonary: On room air. Chest tube x2 on suction    GI: Regular diet. GI prophylaxis    : Quarles Cath removed. Passed trial of void,    Infectious Disease: Levofloxacin, Cefepime, Clindamycin     Enodcrine: -     Heme: Heparin Sub q, SCD    DVT PTX: Heparin Sub q, SCD      GI PTX: None    ***Tubes/Lines/Drains ***  Peripheral IV  Central Venous Line     	Date   Arterial Line		                Date   [] PICC:         	[] Midline		[] Mediport             Urinary Catheter	: Removed  Indication: Strict I&O    Date Placed:       REVIEW OF SYSTEMS    [x] A ten-point review of systems was otherwise negative except as noted.  [ ] Due to altered mental status/intubation, subjective information were not able to be obtained from the patient. History was obtained, to the extent possible, from review of the chart and collateral sources of information.      Vital Signs Last 24 Hrs  T(C): 37.1 (02 Jul 2018 14:00), Max: 38.4 (01 Jul 2018 20:30)  T(F): 98.7 (02 Jul 2018 14:00), Max: 101.1 (01 Jul 2018 20:30)  HR: 88 (02 Jul 2018 17:00) (69 - 94)  BP: 118/80 (02 Jul 2018 14:00) (116/68 - 135/82)  BP(mean): --  RR: 16 (02 Jul 2018 17:00) (15 - 23)  SpO2: 97% (02 Jul 2018 17:00) (95% - 99%)    Daily     Daily     Diet, Regular (07-03-18 @ 09:06)      CURRENT MEDS:  Neurologic Medications  acetaminophen   Tablet 650 milliGRAM(s) Oral every 6 hours PRN For Temp greater than 38 C (100.4 F)  HYDROmorphone  Injectable 1 milliGRAM(s) IV Push every 10 minutes PRN Moderate Pain  HYDROmorphone PCA (1 mG/mL) 30 milliLiter(s) PCA Continuous PCA Continuous  ondansetron Injectable 4 milliGRAM(s) IV Push every 6 hours PRN Nausea  ondansetron Injectable 4 milliGRAM(s) IV Push once PRN Nausea and/or Vomiting    Respiratory Medications    Cardiovascular Medications    Gastrointestinal Medications  sodium chloride 0.9%. 1000 milliLiter(s) IV Continuous <Continuous>    Genitourinary Medications    Hematologic/Oncologic Medications  heparin  Injectable 5000 Unit(s) SubCutaneous every 12 hours    Antimicrobial/Immunologic Medications  cefepime   IVPB 2000 milliGRAM(s) IV Intermittent every 8 hours  clindamycin IVPB 900 milliGRAM(s) IV Intermittent every 8 hours  levoFLOXacin IVPB 750 milliGRAM(s) IV Intermittent every 24 hours    Endocrine/Metabolic Medications    Topical/Other Medications  naloxone Injectable 0.1 milliGRAM(s) IV Push every 3 minutes PRN For ANY of the following changes in patient status:  A. RR LESS THAN 10 breaths per minute, B. Oxygen saturation LESS THAN 90%, C. Sedation score of 6      ICU Vital Signs Last 24 Hrs  T(C): 37 (03 Jul 2018 10:00), Max: 37.5 (03 Jul 2018 04:00)  T(F): 98.6 (03 Jul 2018 10:00), Max: 99.5 (03 Jul 2018 04:00)  HR: 78 (03 Jul 2018 10:00) (69 - 100)  BP: 118/73 (03 Jul 2018 10:00) (107/67 - 126/81)  BP(mean): --  ABP: 123/73 (03 Jul 2018 10:00) (106/58 - 167/85)  ABP(mean): --  RR: 18 (03 Jul 2018 10:00) (15 - 25)  SpO2: 97% (03 Jul 2018 10:00) (95% - 99%)      I&O's Summary    02 Jul 2018 07:01  -  03 Jul 2018 07:00  --------------------------------------------------------  IN: 1220 mL / OUT: 3375 mL / NET: -2155 mL    03 Jul 2018 07:01  -  03 Jul 2018 11:24  --------------------------------------------------------  IN: 210 mL / OUT: 450 mL / NET: -240 mL      I&O's Detail    02 Jul 2018 07:01  -  03 Jul 2018 07:00  --------------------------------------------------------  IN:    dextrose 5% + lactated ringers.: 520 mL    IV PiggyBack: 100 mL    Oral Fluid: 480 mL    sodium chloride 0.9%.: 120 mL  Total IN: 1220 mL    OUT:    Chest Tube: 190 mL    Indwelling Catheter - Urethral: 2760 mL    Voided: 425 mL  Total OUT: 3375 mL    Total NET: -2155 mL      03 Jul 2018 07:01  -  03 Jul 2018 11:24  --------------------------------------------------------  IN:    IV PiggyBack: 150 mL    sodium chloride 0.9%.: 60 mL  Total IN: 210 mL    OUT:    Voided: 450 mL  Total OUT: 450 mL    Total NET: -240 mL          PHYSICAL EXAM:    General/Neuro  RASS:             GCS:     = E   / V   / M      Deficits:                             alert & oriented x 3, no focal deficits  Pupils:    Lungs: clear to auscultation, Normal expansion/effort.     Cardiovascular : S1, S2.  Regular rate and rhythm.  Peripheral edema   Cardiac Rhythm: Normal Sinus Rhythm    GI: Abdomen soft, Non-tender, Non-distended.      Extremities: Extremities warm, pink, well-perfused.     Derm: Good skin turgor, no skin breakdown.      : Quarles catheter out.     CXR:     LABS:  CAPILLARY BLOOD GLUCOSE                              10.0   12.38 )-----------( 374      ( 02 Jul 2018 23:30 )             29.7       07-02    133<L>  |  95<L>  |  7<L>  ----------------------------<  127<H>  4.1   |  26  |  0.6<L>    Ca    8.0<L>      02 Jul 2018 23:30  Mg     1.8     07-02    TPro  5.7<L>  /  Alb  2.5<L>  /  TBili  0.9  /  DBili  0.4<H>  /  AST  16  /  ALT  39  /  AlkPhos  74  07-02      PT/INR - ( 02 Jul 2018 23:30 )   PT: 20.60 sec;   INR: 1.92 ratio         PTT - ( 02 Jul 2018 23:30 )  PTT:29.4 sec          Culture - Body Fluid with Gram Stain (collected 02 Jul 2018 10:08)  Source: .Body Fluid None  Gram Stain (03 Jul 2018 04:45):   Few polymorphonuclear leukocytes seen per low power field   No organisms seen per oil power field    Assessment and plan: 23 year old otherwise healthy male POD1 from Left VATS for Loculated Left Pleural Effusion.    Neuro: Continue pain control    Cardiovascular: Contiue to monitor vitals.    Pulmonary: Continue chest tube to suction. Continue breathing room air.     GI: Continue regular diet and GI prophylaxis. IV lock.     Heme: Continue heparin sub q.     : Continue voiding freely without Quarles

## 2018-07-02 NOTE — CHART NOTE - NSCHARTNOTEFT_GEN_A_CORE
PACU ANESTHESIA ADMISSION NOTE      Procedure: Left vats and decortication  Post op diagnosis:  Empyema lung      ____  Intubated  TV:______       Rate: ______      FiO2: ______    _x___  Patent Airway    _x___  Full return of protective reflexes    _x___  Full recovery from anesthesia / back to baseline status    Vitals  SPO2:-99% on 3 lnc  HR:-102  RR:-14  B.P:-116/68  TEMp:-99.3    Mental Status:  _x___ Awake   ___x_ Alert   _____ Drowsy   _____ Sedated    Nausea/Vomiting:  _x___  NO       ______Yes,   See Post - Op Orders         Pain Scale (0-10):  __0___    Treatment: _x___ None    __x__ See Post - Op/PCA Orders    Post - Operative Fluids:   ___ Oral   ____x See Post - Op Orders    Plan: Discharge:   ____Home       _____Floor     _x____Critical Care    _____  Other:_________________    Comments:  Report endorsed to RN in PACU  Vitals stable  No anesthesia issues or complications noted.  Discharge to patient to ICU when criteria met.  ICU report endorsed to

## 2018-07-02 NOTE — PROGRESS NOTE ADULT - SUBJECTIVE AND OBJECTIVE BOX
TAMI JOHNSON  23y Male   [id number]    Hospital Day: 3  Post Operative Day:  Procedure:  Patient is a 23y old  Male who presents with a chief complaint of Left sided pneumonia, patient is going to the OR for left sided vats today     PAST MEDICAL & SURGICAL HISTORY:  No pertinent past medical history  No significant past surgical history      Events of the Last 24h:no acute events   Vital Signs Last 24 Hrs  T(C): 37.2 (2018 23:18), Max: 39.4 (2018 05:01)  T(F): 98.9 (2018 23:18), Max: 102.9 (2018 05:01)  HR: 91 (2018 22:30) (86 - 105)  BP: 135/82 (2018 22:30) (119/58 - 154/95)  BP(mean): --  RR: 18 (2018 22:30) (18 - 18)  SpO2: 97% (2018 20:13) (90% - 97%)        Diet, Regular (18 @ 16:44)  Diet, NPO after Midnight:      NPO Start Date: 2018,   NPO Start Time: 23:59 (18 @ 19:13)      I&O's Summary    2018 07:  -  2018 07:00  --------------------------------------------------------  IN: 1200 mL / OUT: 0 mL / NET: 1200 mL     I&O's Detail    2018 07:  -  2018 07:00  --------------------------------------------------------  IN:    IV PiggyBack: 200 mL    Oral Fluid: 200 mL    sodium chloride 0.9%.: 800 mL  Total IN: 1200 mL    OUT:  Total OUT: 0 mL    Total NET: 1200 mL          MEDICATIONS  (STANDING):  cefepime   IVPB 2000 milliGRAM(s) IV Intermittent every 8 hours  cefepime   IVPB      clindamycin IVPB      clindamycin IVPB 900 milliGRAM(s) IV Intermittent every 8 hours  enoxaparin Injectable 40 milliGRAM(s) SubCutaneous daily  ibuprofen  Tablet 600 milliGRAM(s) Oral every 8 hours  levoFLOXacin IVPB      levoFLOXacin IVPB 750 milliGRAM(s) IV Intermittent every 24 hours  sodium chloride 0.9%. 1000 milliLiter(s) (100 mL/Hr) IV Continuous <Continuous>    MEDICATIONS  (PRN):  acetaminophen   Tablet 650 milliGRAM(s) Oral every 6 hours PRN For Temp greater than 38 C (100.4 F)  acetaminophen   Tablet. 650 milliGRAM(s) Oral every 6 hours PRN Moderate Pain (4 - 6)  oxyCODONE    IR 10 milliGRAM(s) Oral every 4 hours PRN Severe Pain (7 - 10)  oxyCODONE    IR 5 milliGRAM(s) Oral every 4 hours PRN Moderate Pain (4 - 6)      PHYSICAL EXAM:    GENERAL: NAD    HEENT: NCAT    CHEST/LUNGS: CTAB    HEART: RRR,  No murmurs, rubs, or gallops    ABDOMEN: SNTND +BS    EXTREMITIES:  FROM, No clubbing, cyanosis, or edema, palpable pulse    NEURO: No focal neurological deficits    SKIN: No rashes or lesions    INCISION/WOUNDS:                          10.7   10.57 )-----------( 291      ( 2018 07:49 )             32.3        CBC Full  -  ( 2018 07:49 )  WBC Count : 10.57 K/uL  Hemoglobin : 10.7 g/dL  Hematocrit : 32.3 %  Platelet Count - Automated : 291 K/uL  Mean Cell Volume : 92.0 fL  Mean Cell Hemoglobin : 30.5 pg  Mean Cell Hemoglobin Concentration : 33.1 g/dL  Auto Neutrophil # : 8.12 K/uL  Auto Lymphocyte # : 1.09 K/uL  Auto Monocyte # : 1.22 K/uL  Auto Eosinophil # : 0.06 K/uL  Auto Basophil # : 0.01 K/uL  Auto Neutrophil % : 76.8 %  Auto Lymphocyte % : 10.3 %  Auto Monocyte % : 11.5 %  Auto Eosinophil % : 0.6 %  Auto Basophil % : 0.1 %               136   |  96    |  7                  Ca: 8.0    BMP:   ----------------------------< 95     M.8   (18 @ 07:49)             3.8    |  25    | 0.7                Ph: x        LFT:     TPro: 5.8 / Alb: 2.7 / TBili: 1.2 / DBili: 0.6 / AST: 40 / ALT: 63 / AlkPhos: 98   (18 @ 07:49)    LIVER FUNCTIONS - ( 2018 07:49 )  Alb: 2.7 g/dL / Pro: 5.8 g/dL / ALK PHOS: 98 U/L / ALT: 63 U/L / AST: 40 U/L / GGT: x           PT/INR - ( 2018 22:09 )   PT: 20.50 sec;   INR: 1.91 ratio         PTT - ( 2018 22:09 )  PTT:31.5 sec      Urinalysis Basic - ( 2018 21:50 )    Color: Dark Yellow / Appearance: Cloudy / S.020 / pH: x  Gluc: x / Ketone: Negative  / Bili: Negative / Urobili: 2.0 mg/dL   Blood: x / Protein: Trace mg/dL / Nitrite: Negative   Leuk Esterase: Negative / RBC: 1-2 /HPF / WBC x   Sq Epi: x / Non Sq Epi: x / Bacteria: x        Culture - Blood (collected 2018 06:17)  Source: .Blood None  Preliminary Report (2018 16:01):    No growth to date.    Culture - Blood (collected 2018 06:17)  Source: .Blood None  Preliminary Report (2018 16:01):    No growth to date.    Culture - Sputum (collected 2018 23:46)  Source: .Sputum Sputum  Gram Stain (2018 20:56):    Numerous polymorphonuclear leukocytes per low power field    Few Squamous epithelial cells per low power field    Rare Gram Positive Cocci in Clusters per oil power field    Culture - Blood (collected 2018 20:43)  Source: .Blood None  Preliminary Report (2018 03:01):    No growth to date.    Culture - Blood (collected 2018 20:43)  Source: .Blood None  Preliminary Report (2018 03:01):    No growth to date.    Culture - Blood (collected 2018 11:26)  Source: .Blood Blood  Preliminary Report (2018 01:02):    No growth to date.    Culture - Blood (collected 2018 11:26)  Source: .Blood Blood  Preliminary Report (2018 01:02):    No growth to date.      < from: Xray Chest 1 View- PORTABLE-Routine (18 @ 08:31) >  Left pleural effusion/opacity unchanged. No air leak.    < end of copied text >

## 2018-07-02 NOTE — PROGRESS NOTE ADULT - ASSESSMENT
IMPRESSION:  Sepsis secondary to LLL bacterial PNA with empyema.  Blood cultures negative to date  Sputa gm positive n clusters.  Nares negative for ORSA    RECOMMENDATIONS:   Urine for legionella antigen.  Decortication planned this am  Levofloxacin 750 mg iv q24h  Clindamycin 900 mg iv q8h  Cefepime 2 gm iv q8h.  Vancomycin 1500 mg iv q12h

## 2018-07-02 NOTE — CONSULT NOTE ADULT - SUBJECTIVE AND OBJECTIVE BOX
SICU Consultation Note  =====================================================  HPI: 23y Male  HPI:  22 y/o M with no PMHx presenting for 2 wk hx of cough, high grade temp of 103F, and red+swollen+purulent d/c from his tonsils. He went to  on the  and they sent him home with amoxicillin and 5 day course of prednisone. He is still on the abx course but he finished the prednisone taper. Now he is seeking medical attention bc his cough has become more productive, with thick green sputum production, as well as persistent fevers, and L sided pleuritic chest pain that worsens with deep breaths. CXR done here shows loculated left sided pleural effusion and +sepsis criteria. Case was d/w IR Dr Stark who feels there is not enough fluid to warrant a thoracentesis and recommended against ct chest due to radiation in a young pt. However, case was d/w Pulm fellow who recommended pursuing ct chest and providing broad coverage abx. Pt denies any vomiting/nausea, but has been having dec po intake for the past 2 wks. (2018 14:27)      Surgery Information  OR time:      EBL: 100         IV Fluids:       Blood Products: 0  UOP:          PAST MEDICAL & SURGICAL HISTORY:  No pertinent past medical history  No significant past surgical history    Home Meds: Home Medications: None    Allergies: Allergies    No Known Allergies    Intolerances      Soc:   Advanced Directives: Full Code     ROS:    REVIEW OF SYSTEMS    [x] A ten-point review of systems was otherwise negative except as noted.  [ ] Due to altered mental status/intubation, subjective information were not able to be obtained from the patient. History was obtained, to the extent possible, from review of the chart and collateral sources of information.      CURRENT MEDICATIONS:   --------------------------------------------------------------------------------------  Neurologic Medications  acetaminophen   Tablet 650 milliGRAM(s) Oral every 6 hours PRN For Temp greater than 38 C (100.4 F)  HYDROmorphone  Injectable 1 milliGRAM(s) IV Push every 10 minutes PRN Moderate Pain  HYDROmorphone PCA (1 mG/mL) 30 milliLiter(s) PCA Continuous PCA Continuous  ondansetron Injectable 4 milliGRAM(s) IV Push every 6 hours PRN Nausea  ondansetron Injectable 4 milliGRAM(s) IV Push once PRN Nausea and/or Vomiting    Respiratory Medications    Cardiovascular Medications    Gastrointestinal Medications  dextrose 5% + lactated ringers. 1000 milliLiter(s) IV Continuous <Continuous>    Genitourinary Medications    Hematologic/Oncologic Medications  heparin  Injectable 5000 Unit(s) SubCutaneous every 12 hours    Antimicrobial/Immunologic Medications  cefepime   IVPB 2000 milliGRAM(s) IV Intermittent every 8 hours  clindamycin IVPB 900 milliGRAM(s) IV Intermittent every 8 hours  levoFLOXacin IVPB 750 milliGRAM(s) IV Intermittent every 24 hours    Endocrine/Metabolic Medications    Topical/Other Medications  naloxone Injectable 0.1 milliGRAM(s) IV Push every 3 minutes PRN For ANY of the following changes in patient status:  A. RR LESS THAN 10 breaths per minute, B. Oxygen saturation LESS THAN 90%, C. Sedation score of 6    --------------------------------------------------------------------------------------    VITAL SIGNS, INS/OUTS (last 24 hours):  --------------------------------------------------------------------------------------  ICU Vital Signs Last 24 Hrs  T(C): 37.1 (2018 14:00), Max: 38.4 (2018 20:30)  T(F): 98.7 (2018 14:00), Max: 101.1 (2018 20:30)  HR: 84 (2018 15:00) (69 - 94)  BP: 118/80 (2018 14:00) (116/68 - 135/82)  BP(mean): --  ABP: 145/85 (2018 15:00) (130/85 - 167/85)  ABP(mean): --  RR: 15 (2018 15:) (15 - 23)  SpO2: 98% (2018 15:) (95% - 99%)    I&O's Summary    2018 07:01  -  2018 15:49  --------------------------------------------------------  IN: 440 mL / OUT: 975 mL / NET: -535 mL      --------------------------------------------------------------------------------------    EXAM:  General/Neuro  RASS: 0  GCS: 15  Exam: Normal, NAD, alert, oriented x 3, no focal deficits. PERRLA      Respiratory  Exam: Lungs clear to auscultation, Normal expansion/effort. On room air.     Cardiovascular  Exam: S1, S2.  Regular rate and rhythm. No peripheral edema noted on exam  Cardiac Rhythm: Normal Sinus Rhythm  ECHO: n/a    GI  Exam: Abdomen soft, Non-tender, Non-distended. Normal bowel sounds present.   Current Diet: Regular Diet      Tubes/Lines/Drains   [x] Chest tube [x]Left [ ] Right. Date placed: 18 	  [x] Peripheral IV  [] Central Venous Line     	[] R	[] L	[] IJ	[] Fem	[] SC        Type:	    Date Placed:   [] Arterial Line		[] R	[] L	[] Fem	[] Rad	[] Ax	Date Placed:   [] PICC:         	[] Midline		[] Mediport           [] Urinary Catheter		Date Placed:     Extremities  Exam: Extremities warm, pink, well-perfused.        Derm:  Exam: Good skin turgor, no skin breakdown.      :   Exam: Quarles catheter in place.     LABS  --------------------------------------------------------------------------------------  Labs:  CAPILLARY BLOOD GLUCOSE                              10.8   9.37  )-----------( 304      ( 2018 06:31 )             31.6       Auto Neutrophil %: 78.3 % (18 @ 06:31)  Auto Immature Granulocyte %: 0.4 % (18 @ 06:31)        136  |  96<L>  |  7<L>  ----------------------------<  86  3.8   |  25  |  0.6<L>      Magnesium, Serum: 1.8 mg/dL (18 @ 06:31)  Calcium, Total Serum: 8.5 mg/dL (18 @ 06:31)      LFTs:             5.8  | 1.2  | 40       ------------------[98      ( 2018 07:49 )  2.7  | 0.6  | 63          Lipase:x      Amylase:x             Coags:     17.50  ----< 1.63    ( 2018 06:31 )     30.7            Urinalysis Basic - ( 2018 21:50 )    Color: Dark Yellow / Appearance: Cloudy / S.020 / pH: x  Gluc: x / Ketone: Negative  / Bili: Negative / Urobili: 2.0 mg/dL   Blood: x / Protein: Trace mg/dL / Nitrite: Negative   Leuk Esterase: Negative / RBC: 1-2 /HPF / WBC x   Sq Epi: x / Non Sq Epi: x / Bacteria: x        Culture - Blood (collected 2018 06:17)  Source: .Blood None  Preliminary Report (2018 16:01):    No growth to date.    Culture - Blood (collected 2018 06:17)  Source: .Blood None  Preliminary Report (2018 16:01):    No growth to date.    Culture - Sputum (collected 2018 23:46)  Source: .Sputum Sputum  Gram Stain (2018 20:56):    Numerous polymorphonuclear leukocytes per low power field    Few Squamous epithelial cells per low power field    Rare Gram Positive Cocci in Clusters per oil power field    Culture - Blood (collected 2018 20:43)  Source: .Blood None  Preliminary Report (2018 03:01):    No growth to date.    Culture - Blood (collected 2018 20:43)  Source: .Blood None  Preliminary Report (2018 03:01):    No growth to date.        --------------------------------------------------------------------------------------    OTHER LABS    IMAGING RESULTS      ASSESSMENT:  23y Male POD 0 s/p Left VATS.     PLAN:   Neurologic: No sedation. Dilaudid PCA for pain    Respiratory: Continue breathing on RA. Incentive spirometry. Chest tube to suction.      Cardiovascular: Continue to monitor vital signs.     Gastrointestinal/Nutrition: Regular diet. GI prophylaxis     Renal/Genitourinary: Continue Quarles Catheter.     Hematologic: Subcutaneous heparin. SCD.    Infectious Disease: Continue Cefepime, Clindamycin and Levofloxacin     Lines/Tubes: Quarles cath, Chest tube, peripheral IV    Disposition: SICU    --------------------------------------------------------------------------------------    Critical Care Diagnoses: Pleural Effusion

## 2018-07-02 NOTE — BRIEF OPERATIVE NOTE - PROCEDURE
<<-----Click on this checkbox to enter Procedure VATS, with partial lung decortication  07/02/2018    Active  LMOKO

## 2018-07-02 NOTE — BRIEF OPERATIVE NOTE - PRE-OP DX
Empyema of left pleural space  07/02/2018    Figueroa Juarez  Pleural effusion  07/02/2018    Figueroa Juarez

## 2018-07-03 LAB
ALBUMIN SERPL ELPH-MCNC: 2.5 G/DL — LOW (ref 3.5–5.2)
ALP SERPL-CCNC: 74 U/L — SIGNIFICANT CHANGE UP (ref 30–115)
ALT FLD-CCNC: 39 U/L — SIGNIFICANT CHANGE UP (ref 0–41)
ANION GAP SERPL CALC-SCNC: 12 MMOL/L — SIGNIFICANT CHANGE UP (ref 7–14)
APTT BLD: 29.4 SEC — SIGNIFICANT CHANGE UP (ref 27–39.2)
AST SERPL-CCNC: 16 U/L — SIGNIFICANT CHANGE UP (ref 0–41)
BASOPHILS # BLD AUTO: 0.01 K/UL — SIGNIFICANT CHANGE UP (ref 0–0.2)
BASOPHILS NFR BLD AUTO: 0.1 % — SIGNIFICANT CHANGE UP (ref 0–1)
BILIRUB DIRECT SERPL-MCNC: 0.4 MG/DL — HIGH (ref 0–0.2)
BILIRUB INDIRECT FLD-MCNC: 0.5 MG/DL — SIGNIFICANT CHANGE UP (ref 0.2–1.2)
BILIRUB SERPL-MCNC: 0.9 MG/DL — SIGNIFICANT CHANGE UP (ref 0.2–1.2)
BUN SERPL-MCNC: 7 MG/DL — LOW (ref 10–20)
CALCIUM SERPL-MCNC: 8 MG/DL — LOW (ref 8.5–10.1)
CHLORIDE SERPL-SCNC: 95 MMOL/L — LOW (ref 98–110)
CO2 SERPL-SCNC: 26 MMOL/L — SIGNIFICANT CHANGE UP (ref 17–32)
CREAT SERPL-MCNC: 0.6 MG/DL — LOW (ref 0.7–1.5)
CULTURE RESULTS: SIGNIFICANT CHANGE UP
EOSINOPHIL # BLD AUTO: 0 K/UL — SIGNIFICANT CHANGE UP (ref 0–0.7)
EOSINOPHIL NFR BLD AUTO: 0 % — SIGNIFICANT CHANGE UP (ref 0–8)
GLUCOSE SERPL-MCNC: 127 MG/DL — HIGH (ref 70–99)
HCT VFR BLD CALC: 29.7 % — LOW (ref 42–52)
HGB BLD-MCNC: 10 G/DL — LOW (ref 14–18)
IMM GRANULOCYTES NFR BLD AUTO: 0.7 % — HIGH (ref 0.1–0.3)
INR BLD: 1.92 RATIO — HIGH (ref 0.65–1.3)
LYMPHOCYTES # BLD AUTO: 1.23 K/UL — SIGNIFICANT CHANGE UP (ref 1.2–3.4)
LYMPHOCYTES # BLD AUTO: 9.9 % — LOW (ref 20.5–51.1)
MCHC RBC-ENTMCNC: 30.3 PG — SIGNIFICANT CHANGE UP (ref 27–31)
MCHC RBC-ENTMCNC: 33.7 G/DL — SIGNIFICANT CHANGE UP (ref 32–37)
MCV RBC AUTO: 90 FL — SIGNIFICANT CHANGE UP (ref 80–94)
MONOCYTES # BLD AUTO: 1.05 K/UL — HIGH (ref 0.1–0.6)
MONOCYTES NFR BLD AUTO: 8.5 % — SIGNIFICANT CHANGE UP (ref 1.7–9.3)
NEUTROPHILS # BLD AUTO: 10 K/UL — HIGH (ref 1.4–6.5)
NEUTROPHILS NFR BLD AUTO: 80.8 % — HIGH (ref 42.2–75.2)
NRBC # BLD: 0 /100 WBCS — SIGNIFICANT CHANGE UP (ref 0–0)
PLATELET # BLD AUTO: 374 K/UL — SIGNIFICANT CHANGE UP (ref 130–400)
POTASSIUM SERPL-MCNC: 4.1 MMOL/L — SIGNIFICANT CHANGE UP (ref 3.5–5)
POTASSIUM SERPL-SCNC: 4.1 MMOL/L — SIGNIFICANT CHANGE UP (ref 3.5–5)
PROT SERPL-MCNC: 5.7 G/DL — LOW (ref 6–8)
PROTHROM AB SERPL-ACNC: 20.6 SEC — HIGH (ref 9.95–12.87)
RBC # BLD: 3.3 M/UL — LOW (ref 4.7–6.1)
RBC # FLD: 12.4 % — SIGNIFICANT CHANGE UP (ref 11.5–14.5)
SODIUM SERPL-SCNC: 133 MMOL/L — LOW (ref 135–146)
SPECIMEN SOURCE: SIGNIFICANT CHANGE UP
WBC # BLD: 12.38 K/UL — HIGH (ref 4.8–10.8)
WBC # FLD AUTO: 12.38 K/UL — HIGH (ref 4.8–10.8)

## 2018-07-03 PROCEDURE — 99291 CRITICAL CARE FIRST HOUR: CPT

## 2018-07-03 RX ORDER — DOCUSATE SODIUM 100 MG
100 CAPSULE ORAL DAILY
Qty: 0 | Refills: 0 | Status: DISCONTINUED | OUTPATIENT
Start: 2018-07-03 | End: 2018-07-08

## 2018-07-03 RX ORDER — VANCOMYCIN HCL 1 G
1500 VIAL (EA) INTRAVENOUS EVERY 12 HOURS
Qty: 0 | Refills: 0 | Status: DISCONTINUED | OUTPATIENT
Start: 2018-07-03 | End: 2018-07-05

## 2018-07-03 RX ORDER — SENNA PLUS 8.6 MG/1
1 TABLET ORAL
Qty: 0 | Refills: 0 | Status: DISCONTINUED | OUTPATIENT
Start: 2018-07-03 | End: 2018-07-08

## 2018-07-03 RX ORDER — SODIUM CHLORIDE 9 MG/ML
1000 INJECTION INTRAMUSCULAR; INTRAVENOUS; SUBCUTANEOUS
Qty: 0 | Refills: 0 | Status: DISCONTINUED | OUTPATIENT
Start: 2018-07-03 | End: 2018-07-06

## 2018-07-03 RX ORDER — SODIUM CHLORIDE 9 MG/ML
1000 INJECTION INTRAMUSCULAR; INTRAVENOUS; SUBCUTANEOUS
Qty: 0 | Refills: 0 | Status: DISCONTINUED | OUTPATIENT
Start: 2018-07-03 | End: 2018-07-03

## 2018-07-03 RX ADMIN — HEPARIN SODIUM 5000 UNIT(S): 5000 INJECTION INTRAVENOUS; SUBCUTANEOUS at 05:26

## 2018-07-03 RX ADMIN — CEFEPIME 100 MILLIGRAM(S): 1 INJECTION, POWDER, FOR SOLUTION INTRAMUSCULAR; INTRAVENOUS at 21:06

## 2018-07-03 RX ADMIN — Medication 100 MILLIGRAM(S): at 22:11

## 2018-07-03 RX ADMIN — HEPARIN SODIUM 5000 UNIT(S): 5000 INJECTION INTRAVENOUS; SUBCUTANEOUS at 18:34

## 2018-07-03 RX ADMIN — Medication 100 MILLIGRAM(S): at 21:06

## 2018-07-03 RX ADMIN — SENNA PLUS 1 TABLET(S): 8.6 TABLET ORAL at 22:11

## 2018-07-03 RX ADMIN — Medication 100 MILLIGRAM(S): at 05:26

## 2018-07-03 RX ADMIN — CEFEPIME 100 MILLIGRAM(S): 1 INJECTION, POWDER, FOR SOLUTION INTRAMUSCULAR; INTRAVENOUS at 15:43

## 2018-07-03 RX ADMIN — Medication 100 MILLIGRAM(S): at 15:43

## 2018-07-03 RX ADMIN — SODIUM CHLORIDE 20 MILLILITER(S): 9 INJECTION INTRAMUSCULAR; INTRAVENOUS; SUBCUTANEOUS at 02:01

## 2018-07-03 RX ADMIN — Medication 300 MILLIGRAM(S): at 18:34

## 2018-07-03 RX ADMIN — CEFEPIME 100 MILLIGRAM(S): 1 INJECTION, POWDER, FOR SOLUTION INTRAMUSCULAR; INTRAVENOUS at 05:26

## 2018-07-03 NOTE — PROGRESS NOTE ADULT - ASSESSMENT
23y Male POD 0 s/p Left VATS with decortication and placement of 2 chest tubes    PLAN:   Neurologic: No sedation. Dilaudid PCA for pain  Respiratory: Continue breathing on RA. Incentive spirometry. Chest tubes to suction, monitor output.    Cardiovascular: stable post operatively  Gastrointestinal/Nutrition: Regular diet. GI prophylaxis  Renal/Genitourinary: pending TOV at 8am  Hematologic: Subcutaneous heparin. SCD.  Infectious Disease: Continue Cefepime, Clindamycin and Levofloxacin   Lines/Tubes:  Chest tube, peripheral IV  Disposition: SICU monitoring    --------------------------------------------------------------------------------------    Critical Care Diagnoses: Pleural Effusion

## 2018-07-03 NOTE — PROGRESS NOTE ADULT - SUBJECTIVE AND OBJECTIVE BOX
TAMI JOHNSON  23y, Male      OVERNIGHT EVENTS:    NO fevers.  VATS, with partial lung decortication  07/02/2018    VITALS:  T(F): 98.7, Max: 99.3 (07-02-18 @ 12:37)  HR: 78  BP: 107/67  RR: 17Vital Signs Last 24 Hrs  T(C): 37.1 (02 Jul 2018 14:00), Max: 37.8 (02 Jul 2018 07:06)  T(F): 98.7 (02 Jul 2018 14:00), Max: 99.3 (02 Jul 2018 12:37)  HR: 78 (03 Jul 2018 06:00) (69 - 100)  BP: 107/67 (03 Jul 2018 06:00) (107/67 - 126/81)  BP(mean): --  RR: 17 (03 Jul 2018 06:00) (15 - 25)  SpO2: 99% (03 Jul 2018 06:00) (95% - 99%)    TESTS & MEASUREMENTS:                        10.0   12.38 )-----------( 374      ( 02 Jul 2018 23:30 )             29.7     07-02    133<L>  |  95<L>  |  7<L>  ----------------------------<  127<H>  4.1   |  26  |  0.6<L>    Ca    8.0<L>      02 Jul 2018 23:30  Mg     1.8     07-02    TPro  5.7<L>  /  Alb  2.5<L>  /  TBili  0.9  /  DBili  0.4<H>  /  AST  16  /  ALT  39  /  AlkPhos  74  07-02    LIVER FUNCTIONS - ( 02 Jul 2018 23:30 )  Alb: 2.5 g/dL / Pro: 5.7 g/dL / ALK PHOS: 74 U/L / ALT: 39 U/L / AST: 16 U/L / GGT: x             Culture - Body Fluid with Gram Stain (collected 07-02-18 @ 10:08)  Source: .Body Fluid None  Gram Stain (07-03-18 @ 04:45):    Few polymorphonuclear leukocytes seen per low power field    No organisms seen per oil power field    Culture - Blood (collected 06-30-18 @ 06:17)  Source: .Blood None  Preliminary Report (07-01-18 @ 16:01):    No growth to date.    Culture - Blood (collected 06-30-18 @ 06:17)  Source: .Blood None  Preliminary Report (07-01-18 @ 16:01):    No growth to date.    Culture - Sputum (collected 06-29-18 @ 23:46)  Source: .Sputum Sputum  Gram Stain (07-01-18 @ 20:56):    Numerous polymorphonuclear leukocytes per low power field    Few Squamous epithelial cells per low power field    Rare Gram Positive Cocci in Clusters per oil power field  Preliminary Report (07-02-18 @ 17:06):    Normal Respiratory Seema present    Culture - Blood (collected 06-29-18 @ 20:43)  Source: .Blood None  Preliminary Report (07-01-18 @ 03:01):    No growth to date.    Culture - Blood (collected 06-29-18 @ 20:43)  Source: .Blood None  Preliminary Report (07-01-18 @ 03:01):    No growth to date.    Culture - Blood (collected 06-29-18 @ 11:26)  Source: .Blood Blood  Preliminary Report (07-01-18 @ 01:02):    No growth to date.    Culture - Blood (collected 06-29-18 @ 11:26)  Source: .Blood Blood  Preliminary Report (07-01-18 @ 01:02):    No growth to date.            RADIOLOGY & ADDITIONAL TESTS:    ANTIBIOTICS:  cefepime   IVPB 2000 milliGRAM(s) IV Intermittent every 8 hours  clindamycin IVPB 900 milliGRAM(s) IV Intermittent every 8 hours  levoFLOXacin IVPB 750 milliGRAM(s) IV Intermittent every 24 hours

## 2018-07-03 NOTE — PROGRESS NOTE ADULT - ASSESSMENT
IMPRESSION:  Sepsis secondary to LLL bacterial PNA with empyema.  Blood cultures negative to date  Sputa gm positive in clusters but normal brandon  Nares negative for ORSA.  OF fluid with few PMNs, no organisms.    RECOMMENDATIONS:   Urine for legionella antige  Levofloxacin 750 mg iv q24h  Clindamycin 900 mg iv q8h  Cefepime 2 gm iv q8h.  Vancomycin 1500 mg iv q12h

## 2018-07-03 NOTE — PROGRESS NOTE ADULT - SUBJECTIVE AND OBJECTIVE BOX
TAMI JOHNSON  [id number]  23y Male    Indication for ICU admission:  Admit Date:06-29-18  ICU Date: 7/2/18  OR Date: 7/2/18    PAST MEDICAL & SURGICAL HISTORY:  No pertinent past medical history  No significant past surgical history  No allergies    Home Medications: None        24HRS EVENT:  22 y/o M with no PMHx presenting for 2 wk hx of cough, high grade temp of 103F, and red+swollen+purulent d/c from his tonsils. He went to  on the 17th and they sent him home with amoxicillin and 5 day course of prednisone. He is still on the abx course but he finished the prednisone taper. Presented to ED seeking medical attention bc his cough has become more productive, with thick green sputum production, as well as persistent fevers, and L sided pleuritic chest pain that worsens with deep breaths. CXR showed loculated left sided pleural effusion and +sepsis criteria. Case was d/w IR Dr Stark who feels there is not enough fluid to warrant a thoracentesis and recommended against ct chest due to radiation in a young pt. However, case was d/w Pulm fellow who recommended pursuing ct chest and providing broad coverage abx. Pt denies any vomiting/nausea, but has been having dec po intake for the past 2 wks. CT surgery consult placed and it was decided patient should be taken to OR for VATS with Decortication    Taken to OR for Left sided VATS with Decortication and placement of 2 chest tubes  Neuro: Alert and oriented x3. Denies any pain on Dilaudid PCA.   Cards: Normotensive   Pulmonary: On room air. Chest tube x2 on suction  GI: Regular diet. GI prophylaxis  : Quarles Cath in pending TOV  Infectious Disease: Levofloxacin, Cefepime, Clindamycin   Heme: Heparin Sub q, SCD.             DVT PTX: HSQ    GI PTX: PTX    ***Tubes/Lines/Drains  ***  Peripheral IV           Left chest tubes x2  Urinary Catheter TOV pending      REVIEW OF SYSTEMS    [X ] A ten-point review of systems was otherwise negative except as noted. TAMI JOHNSON  [id number]  23y Male    Indication for ICU admission:  Admit Date:06-29-18  ICU Date: 7/2/18  OR Date: 7/2/18    PAST MEDICAL & SURGICAL HISTORY:  No pertinent past medical history  No significant past surgical history  No allergies    Home Medications: None        24HRS EVENT:  22 y/o M with no PMHx presenting for 2 wk hx of cough, high grade temp of 103F, and red+swollen+purulent d/c from his tonsils. He went to  on the 17th and they sent him home with amoxicillin and 5 day course of prednisone. He is still on the abx course but he finished the prednisone taper. Presented to ED seeking medical attention bc his cough has become more productive, with thick green sputum production, as well as persistent fevers, and L sided pleuritic chest pain that worsens with deep breaths. CXR showed loculated left sided pleural effusion and +sepsis criteria. Case was d/w IR Dr Stark who feels there is not enough fluid to warrant a thoracentesis and recommended against ct chest due to radiation in a young pt. However, case was d/w Pulm fellow who recommended pursuing ct chest and providing broad coverage abx. Pt denies any vomiting/nausea, but has been having dec po intake for the past 2 wks. CT surgery consult placed and it was decided patient should be taken to OR for VATS with Decortication    Taken to OR for Left sided VATS with Decortication and placement of 2 chest tubes  Neuro: Alert and oriented x3. Denies any pain on Dilaudid PCA.   Cards: Normotensive   Pulmonary: On room air. Chest tube x2 on suction s.p vats and decortication  GI: Regular diet. GI prophylaxis  : Quarles Cath in pending TOV  Infectious Disease: Levofloxacin, Cefepime, Clindamycin   Heme: Heparin Sub q, SCD.             DVT PTX: HSQ    GI PTX: PTX    ***Tubes/Lines/Drains  ***  Peripheral IV           Left chest tubes x2  Urinary Catheter TOV pending      REVIEW OF SYSTEMS    [X ] A ten-point review of systems was otherwise negative except as noted.

## 2018-07-03 NOTE — CHART NOTE - NSCHARTNOTEFT_GEN_A_CORE
Patient seen and evaluated.  No anesthesia issues or complaints. Incentive sporometer in use  Patient recovering well.  Patient sitting out of bed in chair. A Line and PCA remain in use.  Patient reports adequate pain control with PCA

## 2018-07-04 LAB
ANION GAP SERPL CALC-SCNC: 11 MMOL/L — SIGNIFICANT CHANGE UP (ref 7–14)
BUN SERPL-MCNC: 8 MG/DL — LOW (ref 10–20)
CALCIUM SERPL-MCNC: 8.3 MG/DL — LOW (ref 8.5–10.1)
CHLORIDE SERPL-SCNC: 94 MMOL/L — LOW (ref 98–110)
CO2 SERPL-SCNC: 31 MMOL/L — SIGNIFICANT CHANGE UP (ref 17–32)
CREAT SERPL-MCNC: 0.6 MG/DL — LOW (ref 0.7–1.5)
GLUCOSE SERPL-MCNC: 93 MG/DL — SIGNIFICANT CHANGE UP (ref 70–99)
HCT VFR BLD CALC: 32.8 % — LOW (ref 42–52)
HGB BLD-MCNC: 10.6 G/DL — LOW (ref 14–18)
MAGNESIUM SERPL-MCNC: 1.9 MG/DL — SIGNIFICANT CHANGE UP (ref 1.8–2.4)
MCHC RBC-ENTMCNC: 30 PG — SIGNIFICANT CHANGE UP (ref 27–31)
MCHC RBC-ENTMCNC: 32.3 G/DL — SIGNIFICANT CHANGE UP (ref 32–37)
MCV RBC AUTO: 92.9 FL — SIGNIFICANT CHANGE UP (ref 80–94)
NRBC # BLD: 0 /100 WBCS — SIGNIFICANT CHANGE UP (ref 0–0)
PHOSPHATE SERPL-MCNC: 3.9 MG/DL — SIGNIFICANT CHANGE UP (ref 2.1–4.9)
PLATELET # BLD AUTO: 351 K/UL — SIGNIFICANT CHANGE UP (ref 130–400)
POTASSIUM SERPL-MCNC: 4 MMOL/L — SIGNIFICANT CHANGE UP (ref 3.5–5)
POTASSIUM SERPL-SCNC: 4 MMOL/L — SIGNIFICANT CHANGE UP (ref 3.5–5)
RBC # BLD: 3.53 M/UL — LOW (ref 4.7–6.1)
RBC # FLD: 12.5 % — SIGNIFICANT CHANGE UP (ref 11.5–14.5)
SODIUM SERPL-SCNC: 136 MMOL/L — SIGNIFICANT CHANGE UP (ref 135–146)
WBC # BLD: 7.22 K/UL — SIGNIFICANT CHANGE UP (ref 4.8–10.8)
WBC # FLD AUTO: 7.22 K/UL — SIGNIFICANT CHANGE UP (ref 4.8–10.8)

## 2018-07-04 PROCEDURE — 99291 CRITICAL CARE FIRST HOUR: CPT

## 2018-07-04 RX ORDER — LACTULOSE 10 G/15ML
20 SOLUTION ORAL EVERY 12 HOURS
Qty: 0 | Refills: 0 | Status: DISCONTINUED | OUTPATIENT
Start: 2018-07-04 | End: 2018-07-08

## 2018-07-04 RX ORDER — MAGNESIUM SULFATE 500 MG/ML
1 VIAL (ML) INJECTION ONCE
Qty: 0 | Refills: 0 | Status: COMPLETED | OUTPATIENT
Start: 2018-07-04 | End: 2018-07-04

## 2018-07-04 RX ADMIN — CEFEPIME 100 MILLIGRAM(S): 1 INJECTION, POWDER, FOR SOLUTION INTRAMUSCULAR; INTRAVENOUS at 22:24

## 2018-07-04 RX ADMIN — LACTULOSE 20 GRAM(S): 10 SOLUTION ORAL at 10:33

## 2018-07-04 RX ADMIN — HEPARIN SODIUM 5000 UNIT(S): 5000 INJECTION INTRAVENOUS; SUBCUTANEOUS at 17:27

## 2018-07-04 RX ADMIN — HEPARIN SODIUM 5000 UNIT(S): 5000 INJECTION INTRAVENOUS; SUBCUTANEOUS at 05:05

## 2018-07-04 RX ADMIN — CEFEPIME 100 MILLIGRAM(S): 1 INJECTION, POWDER, FOR SOLUTION INTRAMUSCULAR; INTRAVENOUS at 05:05

## 2018-07-04 RX ADMIN — SENNA PLUS 1 TABLET(S): 8.6 TABLET ORAL at 05:04

## 2018-07-04 RX ADMIN — Medication 100 GRAM(S): at 04:36

## 2018-07-04 RX ADMIN — Medication 100 MILLIGRAM(S): at 22:24

## 2018-07-04 RX ADMIN — CEFEPIME 100 MILLIGRAM(S): 1 INJECTION, POWDER, FOR SOLUTION INTRAMUSCULAR; INTRAVENOUS at 14:55

## 2018-07-04 RX ADMIN — Medication 100 MILLIGRAM(S): at 05:05

## 2018-07-04 RX ADMIN — Medication 300 MILLIGRAM(S): at 17:26

## 2018-07-04 RX ADMIN — Medication 100 MILLIGRAM(S): at 11:15

## 2018-07-04 RX ADMIN — Medication 100 MILLIGRAM(S): at 14:55

## 2018-07-04 RX ADMIN — Medication 300 MILLIGRAM(S): at 05:05

## 2018-07-04 NOTE — PROGRESS NOTE ADULT - ASSESSMENT
daily chest xray  pca pump - pain managment  icu managment  chest tubes to suction   follow up chest tube outpt

## 2018-07-04 NOTE — CHART NOTE - NSCHARTNOTEFT_GEN_A_CORE
SICU Transfer Note:    Transfer from: SICU  Transfer to:  (x) Surgery    (  ) Telemetry    (  ) Medicine    (  ) Palliative    (  ) Stroke Unit    (  ) _______________      SICU COURSE:  23y Male    22 y/o M with no PMHx presenting for 2 wk hx of cough, high grade temp of 103F, and red+swollen+purulent d/c from his tonsils. He went to  on the 17th and they sent him home with amoxicillin and 5 day course of prednisone. He was still on the abx course at time of presentation but he finished the prednisone taper. Patient then presented to the ED because his cough had become more productive, with thick green sputum production, as well as persistent fevers, and L sided pleuritic chest pain that worsens with deep breaths. CXR done here shows loculated left sided pleural effusion and +sepsis criteria. Case was d/w IR Dr Stark who feels there is not enough fluid to warrant a thoracentesis and recommended against ct chest due to radiation in a young pt. However, case was d/w Pulm fellow who recommended pursuing ct chest and providing broad coverage abx. Pt denies any vomiting/nausea, but has been having dec po intake for the past 2 wks.    PAST MEDICAL & SURGICAL HISTORY:  No pertinent past medical history  No significant past surgical history    Allergies None    No Known Allergies    Intolerances      MEDICATIONS  (STANDING):  cefepime   IVPB 2000 milliGRAM(s) IV Intermittent every 8 hours  clindamycin IVPB 900 milliGRAM(s) IV Intermittent every 8 hours  docusate sodium 100 milliGRAM(s) Oral daily  heparin  Injectable 5000 Unit(s) SubCutaneous every 12 hours  HYDROmorphone PCA (1 mG/mL) 30 milliLiter(s) PCA Continuous PCA Continuous  lactulose Syrup 20 Gram(s) Oral every 12 hours  levoFLOXacin IVPB 750 milliGRAM(s) IV Intermittent every 24 hours  senna 1 Tablet(s) Oral two times a day  sodium chloride 0.9%. 1000 milliLiter(s) (20 mL/Hr) IV Continuous <Continuous>  vancomycin  IVPB 1500 milliGRAM(s) IV Intermittent every 12 hours    MEDICATIONS  (PRN):  acetaminophen   Tablet 650 milliGRAM(s) Oral every 6 hours PRN For Temp greater than 38 C (100.4 F)  naloxone Injectable 0.1 milliGRAM(s) IV Push every 3 minutes PRN For ANY of the following changes in patient status:  A. RR LESS THAN 10 breaths per minute, B. Oxygen saturation LESS THAN 90%, C. Sedation score of 6  ondansetron Injectable 4 milliGRAM(s) IV Push every 6 hours PRN Nausea        Vital Signs Last 24 Hrs  T(C): 37.1 (04 Jul 2018 08:00), Max: 37.7 (03 Jul 2018 20:00)  T(F): 98.8 (04 Jul 2018 08:00), Max: 99.9 (03 Jul 2018 20:00)  HR: 94 (04 Jul 2018 11:00) (68 - 102)  BP: 119/67 (04 Jul 2018 10:00) (102/59 - 133/84)  BP(mean): 90 (04 Jul 2018 09:00) (76 - 105)  RR: 21 (04 Jul 2018 11:00) (11 - 29)  SpO2: 96% (04 Jul 2018 11:00) (90% - 99%)  I&O's Summary    03 Jul 2018 07:01  -  04 Jul 2018 07:00  --------------------------------------------------------  IN: 3140 mL / OUT: 4030 mL / NET: -890 mL    04 Jul 2018 07:01  -  04 Jul 2018 11:49  --------------------------------------------------------  IN: 320 mL / OUT: 800 mL / NET: -480 mL        LABS                                            10.6                  Neurophils% (auto):   x      (07-04 @ 00:42):    7.22 )-----------(351          Lymphocytes% (auto):  x                                             32.8                   Eosinphils% (auto):   x        Manual%: Neutrophils x    ; Lymphocytes x    ; Eosinophils x    ; Bands%: x    ; Blasts x                                    136    |  94     |  8                   Calcium: 8.3   / iCa: x      (07-04 @ 00:42)    ----------------------------<  93        Magnesium: 1.9                              4.0     |  31     |  0.6              Phosphorous: 3.9              ASSESSMENT/PLAN: 23yMale      Neurologic: Alert and oriented. Pain controlled with Dilaudid PCA    Respiratory: Chest tubes to suction x2 (joined by Y connector    Cardiovascular: Normotensive.     Gastrointestinal/Nutrition: Tolerating regular diet. GI prophylaxis. Constipated, on bowel regimen.     Genitourinary/Renal: Quarles Cath removed and pt has passed TOV. Good urine output.    Hematologic: Subcutaneous heparin for DVT prophylaxis.     Infectious Disease: Vancomycin(vanc troph due 7/5/18), Cefepime, Clindamycin and Levofloxacin for pleural effusion. F/U cultures, no growth to date.     Disposition: Surgery floor      For Follow-Up:

## 2018-07-04 NOTE — PROGRESS NOTE ADULT - ATTENDING COMMENTS
6/30 pt seen examined/spoke with Dr De Jesus  I agree with current plan, however if pt worsens over 24 hrs chest tube insertion and thoracotomy is necessary
s/p VATS   doing well with well controlled pain with PCA   CT to suction   tolerating diet   continue ANTBX   transfer to floor.
s/p VATS   pain control CT to suction   OOBTC   admit to SICU
doing well overnight   IVL   resume diet   continue ANTBX   CT to suction   continue SICU care

## 2018-07-04 NOTE — PROGRESS NOTE ADULT - SUBJECTIVE AND OBJECTIVE BOX
TAMI JOHNSON  23y Male   [id number]    Hospital Day: 6  Post Operative Day:2  Procedure:s/p vats decortication  left side   Patient is a 23y old  Male who presents with a chief complaint of Left sided pneumonia (29 Jun 2018 14:27)    PAST MEDICAL & SURGICAL HISTORY:  No pertinent past medical history  No significant past surgical history      Events of the Last 24h:no acute events overnight  Vital Signs Last 24 Hrs  T(C): 36.7 (04 Jul 2018 00:00), Max: 37.7 (03 Jul 2018 20:00)  T(F): 98.1 (04 Jul 2018 00:00), Max: 99.9 (03 Jul 2018 20:00)  HR: 86 (04 Jul 2018 00:00) (70 - 102)  BP: 114/64 (04 Jul 2018 00:00) (107/67 - 133/84)  BP(mean): 82 (04 Jul 2018 00:00) (79 - 105)  RR: 20 (04 Jul 2018 00:00) (11 - 29)  SpO2: 96% (04 Jul 2018 00:00) (92% - 99%)        Diet, Regular (07-03-18 @ 09:06)      I&O's Summary    02 Jul 2018 07:01  -  03 Jul 2018 07:00  --------------------------------------------------------  IN: 1220 mL / OUT: 3375 mL / NET: -2155 mL    03 Jul 2018 07:01  -  04 Jul 2018 00:14  --------------------------------------------------------  IN: 1720 mL / OUT: 2290 mL / NET: -570 mL     I&O's Detail    02 Jul 2018 07:01  -  03 Jul 2018 07:00  --------------------------------------------------------  IN:    dextrose 5% + lactated ringers.: 520 mL    IV PiggyBack: 100 mL    Oral Fluid: 480 mL    sodium chloride 0.9%: 120 mL  Total IN: 1220 mL    OUT:    Chest Tube: 190 mL    Indwelling Catheter - Urethral: 2760 mL    Voided: 425 mL  Total OUT: 3375 mL    Total NET: -2155 mL      03 Jul 2018 07:01  -  04 Jul 2018 00:14  --------------------------------------------------------  IN:    IV PiggyBack: 800 mL    Oral Fluid: 600 mL    sodium chloride 0.9%: 220 mL    sodium chloride 0.9%.: 100 mL  Total IN: 1720 mL    OUT:    Chest Tube: 40 mL    Voided: 2250 mL  Total OUT: 2290 mL    Total NET: -570 mL          MEDICATIONS  (STANDING):  cefepime   IVPB 2000 milliGRAM(s) IV Intermittent every 8 hours  clindamycin IVPB 900 milliGRAM(s) IV Intermittent every 8 hours  docusate sodium 100 milliGRAM(s) Oral daily  heparin  Injectable 5000 Unit(s) SubCutaneous every 12 hours  HYDROmorphone PCA (1 mG/mL) 30 milliLiter(s) PCA Continuous PCA Continuous  levoFLOXacin IVPB 750 milliGRAM(s) IV Intermittent every 24 hours  senna 1 Tablet(s) Oral two times a day  sodium chloride 0.9%. 1000 milliLiter(s) (20 mL/Hr) IV Continuous <Continuous>  vancomycin  IVPB 1500 milliGRAM(s) IV Intermittent every 12 hours    MEDICATIONS  (PRN):  acetaminophen   Tablet 650 milliGRAM(s) Oral every 6 hours PRN For Temp greater than 38 C (100.4 F)  naloxone Injectable 0.1 milliGRAM(s) IV Push every 3 minutes PRN For ANY of the following changes in patient status:  A. RR LESS THAN 10 breaths per minute, B. Oxygen saturation LESS THAN 90%, C. Sedation score of 6  ondansetron Injectable 4 milliGRAM(s) IV Push every 6 hours PRN Nausea      PHYSICAL EXAM:    GENERAL: NAD    HEENT: NCAT    CHEST/LUNGS: CTAB, 2 left sided chest tubes in place , suction minimal sero sang discharge    HEART: RRR,  No murmurs, rubs, or gallops    ABDOMEN: SNTND +BS    EXTREMITIES:  FROM, No clubbing, cyanosis, or edema, palpable pulse    NEURO: No focal neurological deficits    SKIN: No rashes or lesions    INCISION/WOUNDS:                          10.0   12.38 )-----------( 374      ( 02 Jul 2018 23:30 )             29.7        CBC Full  -  ( 02 Jul 2018 23:30 )  WBC Count : 12.38 K/uL  Hemoglobin : 10.0 g/dL  Hematocrit : 29.7 %  Platelet Count - Automated : 374 K/uL  Mean Cell Volume : 90.0 fL  Mean Cell Hemoglobin : 30.3 pg  Mean Cell Hemoglobin Concentration : 33.7 g/dL  Auto Neutrophil # : 10.00 K/uL  Auto Lymphocyte # : 1.23 K/uL  Auto Monocyte # : 1.05 K/uL  Auto Eosinophil # : 0.00 K/uL  Auto Basophil # : 0.01 K/uL  Auto Neutrophil % : 80.8 %  Auto Lymphocyte % : 9.9 %  Auto Monocyte % : 8.5 %  Auto Eosinophil % : 0.0 %  Auto Basophil % : 0.1 %               133   |  95    |  7                  Ca: 8.0    BMP:   ----------------------------< 127    Mg: x     (07-02-18 @ 23:30)             4.1    |  26    | 0.6                Ph: x        LFT:     TPro: 5.7 / Alb: 2.5 / TBili: 0.9 / DBili: 0.4 / AST: 16 / ALT: 39 / AlkPhos: 74   (07-02-18 @ 23:30)    LIVER FUNCTIONS - ( 02 Jul 2018 23:30 )  Alb: 2.5 g/dL / Pro: 5.7 g/dL / ALK PHOS: 74 U/L / ALT: 39 U/L / AST: 16 U/L / GGT: x           PT/INR - ( 02 Jul 2018 23:30 )   PT: 20.60 sec;   INR: 1.92 ratio         PTT - ( 02 Jul 2018 23:30 )  PTT:29.4 sec          Culture - Body Fluid with Gram Stain (collected 02 Jul 2018 10:08)  Source: .Body Fluid None  Gram Stain (03 Jul 2018 04:45):    Few polymorphonuclear leukocytes seen per low power field    No organisms seen per oil power field  Preliminary Report (03 Jul 2018 20:55):    Few Gram Positive Cocci

## 2018-07-04 NOTE — PROGRESS NOTE ADULT - ASSESSMENT
23y Male POD 0 s/p Left VATS with decortication and placement of 2 chest tubes    PLAN:   Neurologic: No sedation. Dilaudid PCA for pain  Respiratory: Continue breathing on RA. Incentive spirometry. Chest tubes to suction, monitor output.  f.u with ct about waterseal for ambulation  Cardiovascular: stable post operatively  Gastrointestinal/Nutrition: Regular diet. GI prophylaxis  Renal/Genitourinary: freely voiding  Hematologic: Subcutaneous heparin. SCD.  Infectious Disease: Continue Cefepime, Clindamycin and Levofloxacin   Lines/Tubes:  Chest tube, peripheral IV  Disposition: possible downgrade,    --------------------------------------------------------------------------------------    Critical Care Diagnoses: Pleural Effusion

## 2018-07-05 LAB
ANION GAP SERPL CALC-SCNC: 9 MMOL/L — SIGNIFICANT CHANGE UP (ref 7–14)
BUN SERPL-MCNC: 8 MG/DL — LOW (ref 10–20)
CALCIUM SERPL-MCNC: 8.6 MG/DL — SIGNIFICANT CHANGE UP (ref 8.5–10.1)
CHLORIDE SERPL-SCNC: 95 MMOL/L — LOW (ref 98–110)
CO2 SERPL-SCNC: 31 MMOL/L — SIGNIFICANT CHANGE UP (ref 17–32)
CREAT SERPL-MCNC: 0.6 MG/DL — LOW (ref 0.7–1.5)
CULTURE RESULTS: SIGNIFICANT CHANGE UP
GLUCOSE SERPL-MCNC: 94 MG/DL — SIGNIFICANT CHANGE UP (ref 70–99)
HCT VFR BLD CALC: 31.2 % — LOW (ref 42–52)
HGB BLD-MCNC: 10.2 G/DL — LOW (ref 14–18)
MAGNESIUM SERPL-MCNC: 1.9 MG/DL — SIGNIFICANT CHANGE UP (ref 1.8–2.4)
MCHC RBC-ENTMCNC: 30.4 PG — SIGNIFICANT CHANGE UP (ref 27–31)
MCHC RBC-ENTMCNC: 32.7 G/DL — SIGNIFICANT CHANGE UP (ref 32–37)
MCV RBC AUTO: 92.9 FL — SIGNIFICANT CHANGE UP (ref 80–94)
NRBC # BLD: 0 /100 WBCS — SIGNIFICANT CHANGE UP (ref 0–0)
PHOSPHATE SERPL-MCNC: 4.8 MG/DL — SIGNIFICANT CHANGE UP (ref 2.1–4.9)
PLATELET # BLD AUTO: 329 K/UL — SIGNIFICANT CHANGE UP (ref 130–400)
POTASSIUM SERPL-MCNC: 4.4 MMOL/L — SIGNIFICANT CHANGE UP (ref 3.5–5)
POTASSIUM SERPL-SCNC: 4.4 MMOL/L — SIGNIFICANT CHANGE UP (ref 3.5–5)
RBC # BLD: 3.36 M/UL — LOW (ref 4.7–6.1)
RBC # FLD: 12.2 % — SIGNIFICANT CHANGE UP (ref 11.5–14.5)
SODIUM SERPL-SCNC: 135 MMOL/L — SIGNIFICANT CHANGE UP (ref 135–146)
SPECIMEN SOURCE: SIGNIFICANT CHANGE UP
SURGICAL PATHOLOGY STUDY: SIGNIFICANT CHANGE UP
WBC # BLD: 6.07 K/UL — SIGNIFICANT CHANGE UP (ref 4.8–10.8)
WBC # FLD AUTO: 6.07 K/UL — SIGNIFICANT CHANGE UP (ref 4.8–10.8)

## 2018-07-05 RX ORDER — CEFTRIAXONE 500 MG/1
2 INJECTION, POWDER, FOR SOLUTION INTRAMUSCULAR; INTRAVENOUS EVERY 12 HOURS
Qty: 0 | Refills: 0 | Status: DISCONTINUED | OUTPATIENT
Start: 2018-07-05 | End: 2018-07-08

## 2018-07-05 RX ORDER — METRONIDAZOLE 500 MG
500 TABLET ORAL EVERY 8 HOURS
Qty: 0 | Refills: 0 | Status: DISCONTINUED | OUTPATIENT
Start: 2018-07-05 | End: 2018-07-07

## 2018-07-05 RX ADMIN — HEPARIN SODIUM 5000 UNIT(S): 5000 INJECTION INTRAVENOUS; SUBCUTANEOUS at 17:23

## 2018-07-05 RX ADMIN — LACTULOSE 20 GRAM(S): 10 SOLUTION ORAL at 06:23

## 2018-07-05 RX ADMIN — HEPARIN SODIUM 5000 UNIT(S): 5000 INJECTION INTRAVENOUS; SUBCUTANEOUS at 06:24

## 2018-07-05 RX ADMIN — Medication 300 MILLIGRAM(S): at 06:33

## 2018-07-05 RX ADMIN — LACTULOSE 20 GRAM(S): 10 SOLUTION ORAL at 17:23

## 2018-07-05 RX ADMIN — Medication 100 MILLIGRAM(S): at 14:45

## 2018-07-05 RX ADMIN — Medication 100 MILLIGRAM(S): at 06:33

## 2018-07-05 RX ADMIN — SENNA PLUS 1 TABLET(S): 8.6 TABLET ORAL at 17:23

## 2018-07-05 RX ADMIN — CEFEPIME 100 MILLIGRAM(S): 1 INJECTION, POWDER, FOR SOLUTION INTRAMUSCULAR; INTRAVENOUS at 14:45

## 2018-07-05 RX ADMIN — SENNA PLUS 1 TABLET(S): 8.6 TABLET ORAL at 06:23

## 2018-07-05 RX ADMIN — CEFEPIME 100 MILLIGRAM(S): 1 INJECTION, POWDER, FOR SOLUTION INTRAMUSCULAR; INTRAVENOUS at 06:33

## 2018-07-05 RX ADMIN — CEFTRIAXONE 100 GRAM(S): 500 INJECTION, POWDER, FOR SOLUTION INTRAMUSCULAR; INTRAVENOUS at 17:24

## 2018-07-05 RX ADMIN — Medication 100 MILLIGRAM(S): at 21:29

## 2018-07-05 NOTE — PROGRESS NOTE ADULT - SUBJECTIVE AND OBJECTIVE BOX
TAMI JOHNSON  23y Male   [id number]    Hospital Day: 7  Post Operative Day: 3  Procedure: s/p VATS decortication left side.   Patient is a 23y old  Male who presents with a chief complaint of Left sided pneumonia (2018 14:27)    PAST MEDICAL & SURGICAL HISTORY:  No pertinent past medical history  No significant past surgical history      Events of the Last 24h: Pt is laying comfortably in bed without any complaints. Pt is ambulating, pulling 1000 on incentive spirometry, states he is able to go to 1500 when is he standing. Pt states his pain is well controlled.     Vital Signs Last 24 Hrs  T(C): 37.4 (2018 23:46), Max: 37.8 (2018 20:00)  T(F): 99.3 (2018 23:46), Max: 100.1 (2018 20:00)  HR: 75 (2018 23:46) (68 - 100)  BP: 111/59 (2018 23:46) (106/60 - 120/74)  BP(mean): 97 (2018 12:00) (76 - 97)  RR: 18 (2018 23:46) (13 - 21)  SpO2: 94% (2018 12:00) (91% - 97%)        Diet, Regular (18 @ 09:06)      I&O's Summary    2018 07:  -  2018 07:00  --------------------------------------------------------  IN: 3140 mL / OUT: 4030 mL / NET: -890 mL    2018 07:01  -  2018 02:08  --------------------------------------------------------  IN: 1090 mL / OUT: 1525 mL / NET: -435 mL     I&O's Detail    2018 07:  -  2018 07:00  --------------------------------------------------------  IN:    IV PiggyBack: 1400 mL    Oral Fluid: 1300 mL    sodium chloride 0.9%: 220 mL    sodium chloride 0.9%.: 220 mL  Total IN: 3140 mL    OUT:    Chest Tube: 80 mL    Voided: 3950 mL  Total OUT: 4030 mL    Total NET: -890 mL      2018 07:01  -  2018 02:08  --------------------------------------------------------  IN:    IV PiggyBack: 750 mL    Oral Fluid: 240 mL    sodium chloride 0.9%.: 100 mL  Total IN: 1090 mL    OUT:    Chest Tube: 25 mL    Voided: 1500 mL  Total OUT: 1525 mL    Total NET: -435 mL          MEDICATIONS  (STANDING):  cefepime   IVPB 2000 milliGRAM(s) IV Intermittent every 8 hours  clindamycin IVPB 900 milliGRAM(s) IV Intermittent every 8 hours  docusate sodium 100 milliGRAM(s) Oral daily  heparin  Injectable 5000 Unit(s) SubCutaneous every 12 hours  HYDROmorphone PCA (1 mG/mL) 30 milliLiter(s) PCA Continuous PCA Continuous  lactulose Syrup 20 Gram(s) Oral every 12 hours  levoFLOXacin IVPB 750 milliGRAM(s) IV Intermittent every 24 hours  senna 1 Tablet(s) Oral two times a day  sodium chloride 0.9%. 1000 milliLiter(s) (20 mL/Hr) IV Continuous <Continuous>  vancomycin  IVPB 1500 milliGRAM(s) IV Intermittent every 12 hours    MEDICATIONS  (PRN):  acetaminophen   Tablet 650 milliGRAM(s) Oral every 6 hours PRN For Temp greater than 38 C (100.4 F)  naloxone Injectable 0.1 milliGRAM(s) IV Push every 3 minutes PRN For ANY of the following changes in patient status:  A. RR LESS THAN 10 breaths per minute, B. Oxygen saturation LESS THAN 90%, C. Sedation score of 6  ondansetron Injectable 4 milliGRAM(s) IV Push every 6 hours PRN Nausea      PHYSICAL EXAM:    GENERAL: NAD  HEENT: NCAT  CHEST/LUNGS: CTAB, 2 left sided chest tubes in place , suction minimal sero sang discharge  HEART: RRR,  No murmurs, rubs, or gallops  ABDOMEN: SNTND +BS  EXTREMITIES:  FROM, No clubbing, cyanosis, or edema, palpable pulse  NEURO: No focal neurological deficits  SKIN: No rashes or lesions                              10.2   6.07  )-----------( 329      ( 2018 23:51 )             31.2        CBC Full  -  ( 2018 23:51 )  WBC Count : 6.07 K/uL  Hemoglobin : 10.2 g/dL  Hematocrit : 31.2 %  Platelet Count - Automated : 329 K/uL  Mean Cell Volume : 92.9 fL  Mean Cell Hemoglobin : 30.4 pg  Mean Cell Hemoglobin Concentration : 32.7 g/dL  Auto Neutrophil # : x  Auto Lymphocyte # : x  Auto Monocyte # : x  Auto Eosinophil # : x  Auto Basophil # : x  Auto Neutrophil % : x  Auto Lymphocyte % : x  Auto Monocyte % : x  Auto Eosinophil % : x  Auto Basophil % : x               135   |  95    |  8                  Ca: 8.6    BMP:   ----------------------------< 94     M.9   (18 @ 23:51)             4.4    |  31    | 0.6                Ph: 4.8      LFT:     TPro: 5.7 / Alb: 2.5 / TBili: 0.9 / DBili: 0.4 / AST: 16 / ALT: 39 / AlkPhos: 74   (18 @ 23:30)                Culture - Body Fluid with Gram Stain (collected 2018 10:08)  Source: .Body Fluid None  Gram Stain (2018 04:45):    Few polymorphonuclear leukocytes seen per low power field    No organisms seen per oil power field  Preliminary Report (2018 21:42):    Few Streptococcus anginosus

## 2018-07-05 NOTE — PROGRESS NOTE ADULT - SUBJECTIVE AND OBJECTIVE BOX
TAMI JOHNSON  23y, Male      OVERNIGHT EVENTS:    better.    VITALS:  T(F): 98, Max: 100.1 (07-04-18 @ 20:00)  HR: 77  BP: 120/62  RR: 18Vital Signs Last 24 Hrs  T(C): 36.7 (05 Jul 2018 11:55), Max: 37.8 (04 Jul 2018 20:00)  T(F): 98 (05 Jul 2018 11:55), Max: 100.1 (04 Jul 2018 20:00)  HR: 77 (05 Jul 2018 11:55) (75 - 86)  BP: 120/62 (05 Jul 2018 11:55) (110/59 - 120/74)  BP(mean): --  RR: 18 (05 Jul 2018 11:55) (18 - 20)  SpO2: 96% (05 Jul 2018 04:30) (96% - 96%)    TESTS & MEASUREMENTS:                        10.2   6.07  )-----------( 329      ( 04 Jul 2018 23:51 )             31.2     07-04    135  |  95<L>  |  8<L>  ----------------------------<  94  4.4   |  31  |  0.6<L>    Ca    8.6      04 Jul 2018 23:51  Phos  4.8     07-04  Mg     1.9     07-04          Culture - Body Fluid with Gram Stain (collected 07-02-18 @ 10:08)  Source: .Body Fluid None  Gram Stain (07-03-18 @ 04:45):    Few polymorphonuclear leukocytes seen per low power field    No organisms seen per oil power field  Preliminary Report (07-04-18 @ 21:42):    Few Streptococcus anginosus    Culture - Blood (collected 06-30-18 @ 06:17)  Source: .Blood None  Preliminary Report (07-01-18 @ 16:01):    No growth to date.    Culture - Blood (collected 06-30-18 @ 06:17)  Source: .Blood None  Preliminary Report (07-01-18 @ 16:01):    No growth to date.    Culture - Sputum (collected 06-29-18 @ 23:46)  Source: .Sputum Sputum  Gram Stain (07-01-18 @ 20:56):    Numerous polymorphonuclear leukocytes per low power field    Few Squamous epithelial cells per low power field    Rare Gram Positive Cocci in Clusters per oil power field  Final Report (07-03-18 @ 16:49):    Normal Respiratory Seema present    Culture - Blood (collected 06-29-18 @ 20:43)  Source: .Blood None  Final Report (07-05-18 @ 03:00):    No growth at 5 days.    Culture - Blood (collected 06-29-18 @ 20:43)  Source: .Blood None  Final Report (07-05-18 @ 03:00):    No growth at 5 days.    Culture - Blood (collected 06-29-18 @ 11:26)  Source: .Blood Blood  Final Report (07-05-18 @ 01:00):    No growth at 5 days.    Culture - Blood (collected 06-29-18 @ 11:26)  Source: .Blood Blood  Final Report (07-05-18 @ 01:00):    No growth at 5 days.            RADIOLOGY & ADDITIONAL TESTS:    ANTIBIOTICS:  cefepime   IVPB 2000 milliGRAM(s) IV Intermittent every 8 hours  clindamycin IVPB 900 milliGRAM(s) IV Intermittent every 8 hours  levoFLOXacin IVPB 750 milliGRAM(s) IV Intermittent every 24 hours  vancomycin  IVPB 1500 milliGRAM(s) IV Intermittent every 12 hours

## 2018-07-05 NOTE — PROGRESS NOTE ADULT - ASSESSMENT
23M s/p VATS decortication left side. Pt is laying comfortably in bed, 2 left CT place, y connected. Pt is ambulating, pulling 1000 on incentive spirometry.  - daily CXR  - f/u CT output

## 2018-07-05 NOTE — PROGRESS NOTE ADULT - ASSESSMENT
IMPRESSION:  Empyema secondary to Strep anginosus.    RECOMMENDATIONS:  Rocephin 2 gm iv q12h for 48 h then 2 gm iv q24.  Flagyl 500 mg iv q8h.  D/c other antibiotics.

## 2018-07-06 LAB
ANION GAP SERPL CALC-SCNC: 15 MMOL/L — HIGH (ref 7–14)
BASOPHILS # BLD AUTO: 0.04 K/UL — SIGNIFICANT CHANGE UP (ref 0–0.2)
BASOPHILS NFR BLD AUTO: 0.6 % — SIGNIFICANT CHANGE UP (ref 0–1)
BUN SERPL-MCNC: 9 MG/DL — LOW (ref 10–20)
CALCIUM SERPL-MCNC: 9.1 MG/DL — SIGNIFICANT CHANGE UP (ref 8.5–10.1)
CHLORIDE SERPL-SCNC: 95 MMOL/L — LOW (ref 98–110)
CO2 SERPL-SCNC: 28 MMOL/L — SIGNIFICANT CHANGE UP (ref 17–32)
CREAT SERPL-MCNC: 0.6 MG/DL — LOW (ref 0.7–1.5)
EOSINOPHIL # BLD AUTO: 0.22 K/UL — SIGNIFICANT CHANGE UP (ref 0–0.7)
EOSINOPHIL NFR BLD AUTO: 3.4 % — SIGNIFICANT CHANGE UP (ref 0–8)
GLUCOSE SERPL-MCNC: 89 MG/DL — SIGNIFICANT CHANGE UP (ref 70–99)
HCT VFR BLD CALC: 31.9 % — LOW (ref 42–52)
HGB BLD-MCNC: 10.3 G/DL — LOW (ref 14–18)
IMM GRANULOCYTES NFR BLD AUTO: 2.3 % — HIGH (ref 0.1–0.3)
LYMPHOCYTES # BLD AUTO: 1.61 K/UL — SIGNIFICANT CHANGE UP (ref 1.2–3.4)
LYMPHOCYTES # BLD AUTO: 24.8 % — SIGNIFICANT CHANGE UP (ref 20.5–51.1)
MAGNESIUM SERPL-MCNC: 2.1 MG/DL — SIGNIFICANT CHANGE UP (ref 1.8–2.4)
MCHC RBC-ENTMCNC: 29.9 PG — SIGNIFICANT CHANGE UP (ref 27–31)
MCHC RBC-ENTMCNC: 32.3 G/DL — SIGNIFICANT CHANGE UP (ref 32–37)
MCV RBC AUTO: 92.7 FL — SIGNIFICANT CHANGE UP (ref 80–94)
MONOCYTES # BLD AUTO: 0.7 K/UL — HIGH (ref 0.1–0.6)
MONOCYTES NFR BLD AUTO: 10.8 % — HIGH (ref 1.7–9.3)
NEUTROPHILS # BLD AUTO: 3.76 K/UL — SIGNIFICANT CHANGE UP (ref 1.4–6.5)
NEUTROPHILS NFR BLD AUTO: 58.1 % — SIGNIFICANT CHANGE UP (ref 42.2–75.2)
NRBC # BLD: 0 /100 WBCS — SIGNIFICANT CHANGE UP (ref 0–0)
PHOSPHATE SERPL-MCNC: 4.2 MG/DL — SIGNIFICANT CHANGE UP (ref 2.1–4.9)
PLATELET # BLD AUTO: 354 K/UL — SIGNIFICANT CHANGE UP (ref 130–400)
POTASSIUM SERPL-MCNC: 4.6 MMOL/L — SIGNIFICANT CHANGE UP (ref 3.5–5)
POTASSIUM SERPL-SCNC: 4.6 MMOL/L — SIGNIFICANT CHANGE UP (ref 3.5–5)
RBC # BLD: 3.44 M/UL — LOW (ref 4.7–6.1)
RBC # FLD: 12.1 % — SIGNIFICANT CHANGE UP (ref 11.5–14.5)
SODIUM SERPL-SCNC: 138 MMOL/L — SIGNIFICANT CHANGE UP (ref 135–146)
WBC # BLD: 6.48 K/UL — SIGNIFICANT CHANGE UP (ref 4.8–10.8)
WBC # FLD AUTO: 6.48 K/UL — SIGNIFICANT CHANGE UP (ref 4.8–10.8)

## 2018-07-06 RX ADMIN — LACTULOSE 20 GRAM(S): 10 SOLUTION ORAL at 05:24

## 2018-07-06 RX ADMIN — Medication 100 MILLIGRAM(S): at 05:34

## 2018-07-06 RX ADMIN — Medication 100 MILLIGRAM(S): at 14:08

## 2018-07-06 RX ADMIN — Medication 100 MILLIGRAM(S): at 21:22

## 2018-07-06 RX ADMIN — HEPARIN SODIUM 5000 UNIT(S): 5000 INJECTION INTRAVENOUS; SUBCUTANEOUS at 19:28

## 2018-07-06 RX ADMIN — SENNA PLUS 1 TABLET(S): 8.6 TABLET ORAL at 05:29

## 2018-07-06 RX ADMIN — LACTULOSE 20 GRAM(S): 10 SOLUTION ORAL at 19:29

## 2018-07-06 RX ADMIN — CEFTRIAXONE 100 GRAM(S): 500 INJECTION, POWDER, FOR SOLUTION INTRAMUSCULAR; INTRAVENOUS at 05:27

## 2018-07-06 RX ADMIN — SENNA PLUS 1 TABLET(S): 8.6 TABLET ORAL at 19:27

## 2018-07-06 RX ADMIN — HYDROMORPHONE HYDROCHLORIDE 30 MILLILITER(S): 2 INJECTION INTRAMUSCULAR; INTRAVENOUS; SUBCUTANEOUS at 20:50

## 2018-07-06 RX ADMIN — HEPARIN SODIUM 5000 UNIT(S): 5000 INJECTION INTRAVENOUS; SUBCUTANEOUS at 05:27

## 2018-07-06 RX ADMIN — Medication 100 MILLIGRAM(S): at 11:43

## 2018-07-06 RX ADMIN — CEFTRIAXONE 100 GRAM(S): 500 INJECTION, POWDER, FOR SOLUTION INTRAMUSCULAR; INTRAVENOUS at 19:26

## 2018-07-06 NOTE — PROGRESS NOTE ADULT - RS GEN PE MLT RESP DETAILS PC
diminished breath sounds, L

## 2018-07-06 NOTE — PROGRESS NOTE ADULT - ASSESSMENT
IMPRESSION:  Empyema secondary to Strep anginosus.    RECOMMENDATIONS:  Rocephin 2 gm iv q12h for 48 h then 2 gm iv q24.  Discharge on Rocephin 2 gm iv q24h for 10 days and then one week of po Augmentin 875 mg q12h for 5 more days.  No flagyl  Recall prn please.

## 2018-07-06 NOTE — PROGRESS NOTE ADULT - SUBJECTIVE AND OBJECTIVE BOX
TAMI JOHNSON  23y, Male      OVERNIGHT EVENTS:    no fevers.    VITALS:  T(F): 97.5, Max: 98.7 (07-05-18 @ 15:30)  HR: 63  BP: 111/62  RR: 18Vital Signs Last 24 Hrs  T(C): 36.4 (06 Jul 2018 07:30), Max: 37.1 (05 Jul 2018 15:30)  T(F): 97.5 (06 Jul 2018 07:30), Max: 98.7 (05 Jul 2018 15:30)  HR: 63 (06 Jul 2018 07:30) (63 - 87)  BP: 111/62 (06 Jul 2018 07:30) (111/62 - 125/69)  BP(mean): --  RR: 18 (06 Jul 2018 07:30) (18 - 18)  SpO2: --    TESTS & MEASUREMENTS:                        10.3   6.48  )-----------( 354      ( 06 Jul 2018 05:49 )             31.9     07-06    138  |  95<L>  |  9<L>  ----------------------------<  89  4.6   |  28  |  0.6<L>    Ca    9.1      06 Jul 2018 05:49  Phos  4.2     07-06  Mg     2.1     07-06          Culture - Body Fluid with Gram Stain (collected 07-02-18 @ 10:08)  Source: .Body Fluid None  Gram Stain (07-03-18 @ 04:45):    Few polymorphonuclear leukocytes seen per low power field    No organisms seen per oil power field  Preliminary Report (07-04-18 @ 21:42):    Few Streptococcus anginosus  Organism: Streptococcus anginosus (07-05-18 @ 17:45)  Organism: Streptococcus anginosus (07-05-18 @ 17:45)      -  Ceftriaxone: S      -  Clindamycin: R      -  Erythromycin: R      -  Vancomycin: S      Method Type: ALCON    Culture - Blood (collected 06-30-18 @ 06:17)  Source: .Blood None  Final Report (07-05-18 @ 16:00):    No growth at 5 days.    Culture - Blood (collected 06-30-18 @ 06:17)  Source: .Blood None  Final Report (07-05-18 @ 16:00):    No growth at 5 days.    Culture - Sputum (collected 06-29-18 @ 23:46)  Source: .Sputum Sputum  Gram Stain (07-01-18 @ 20:56):    Numerous polymorphonuclear leukocytes per low power field    Few Squamous epithelial cells per low power field    Rare Gram Positive Cocci in Clusters per oil power field  Final Report (07-03-18 @ 16:49):    Normal Respiratory Seema present    Culture - Blood (collected 06-29-18 @ 20:43)  Source: .Blood None  Final Report (07-05-18 @ 03:00):    No growth at 5 days.    Culture - Blood (collected 06-29-18 @ 20:43)  Source: .Blood None  Final Report (07-05-18 @ 03:00):    No growth at 5 days.            RADIOLOGY & ADDITIONAL TESTS:    ANTIBIOTICS:  cefTRIAXone   IVPB 2 Gram(s) IV Intermittent every 12 hours  metroNIDAZOLE  IVPB 500 milliGRAM(s) IV Intermittent every 8 hours

## 2018-07-06 NOTE — PROCEDURE NOTE - NSPROCDETAILS_GEN_ALL_CORE
sterile technique, catheter placed/ultrasound guidance/sterile dressing applied/supine position/location identified, draped/prepped, sterile technique used

## 2018-07-06 NOTE — PROGRESS NOTE ADULT - ASSESSMENT
23M s/p VATS decortication left side. Anterior straight CT pulled earlier today, One CT in place. Pt is ambulating, pulling 1000 on incentive spirometry, pain well controlled.   - daily CXR  - f/u CT output.   - f/u pathology

## 2018-07-06 NOTE — DIETITIAN INITIAL EVALUATION ADULT. - SOURCE
Day 4 s/p VATS decortication L side. Oain is well controlled. Daily CXR. f/u CT output. f/u pathology./other (specify)/patient

## 2018-07-06 NOTE — PROGRESS NOTE ADULT - SUBJECTIVE AND OBJECTIVE BOX
TAMI JOHNSON  23y Male   [id number]    Hospital Day: 8  Post Operative Day: 4  Procedure: s/p VATS decortication left side.   Patient is a 23y old  Male who presents with a chief complaint of Left sided pneumonia (2018 14:27)    PAST MEDICAL & SURGICAL HISTORY:  No pertinent past medical history  No significant past surgical history      Events of the Last 24h: Anterior straight CT pulled today, one Left CT in place. Pt is laying comfortably in bed, pulling 1000 on incentive spirometry, ambulating states pain is well controlled.     Vital Signs Last 24 Hrs  T(C): 36.9 (2018 23:45), Max: 37.2 (2018 07:30)  T(F): 98.5 (2018 23:45), Max: 99 (2018 07:30)  HR: 87 (2018 23:45) (75 - 87)  BP: 125/69 (2018 23:45) (110/59 - 125/69)  BP(mean): --  RR: 18 (2018 23:45) (18 - 18)  SpO2: 96% (2018 04:30) (96% - 96%)        Diet, Regular (18 @ 09:06)      I&O's Summary    2018 07:  -  2018 07:00  --------------------------------------------------------  IN: 1090 mL / OUT: 2825 mL / NET: -1735 mL    2018 07:  -  2018 00:46  --------------------------------------------------------  IN: 0 mL / OUT: 2065 mL / NET: -2065 mL     I&O's Detail    2018 07:  -  2018 07:00  --------------------------------------------------------  IN:    IV PiggyBack: 750 mL    Oral Fluid: 240 mL    sodium chloride 0.9%.: 100 mL  Total IN: 1090 mL    OUT:    Chest Tube: 25 mL    Voided: 2800 mL  Total OUT: 2825 mL    Total NET: -1735 mL      2018 07:01  -  2018 00:46  --------------------------------------------------------  IN:  Total IN: 0 mL    OUT:    Chest Tube: 15 mL    Voided: 2050 mL  Total OUT: 2065 mL    Total NET: -2065 mL          MEDICATIONS  (STANDING):  cefTRIAXone   IVPB 2 Gram(s) IV Intermittent every 12 hours  docusate sodium 100 milliGRAM(s) Oral daily  heparin  Injectable 5000 Unit(s) SubCutaneous every 12 hours  HYDROmorphone PCA (1 mG/mL) 30 milliLiter(s) PCA Continuous PCA Continuous  lactulose Syrup 20 Gram(s) Oral every 12 hours  metroNIDAZOLE  IVPB 500 milliGRAM(s) IV Intermittent every 8 hours  senna 1 Tablet(s) Oral two times a day  sodium chloride 0.9%. 1000 milliLiter(s) (20 mL/Hr) IV Continuous <Continuous>    MEDICATIONS  (PRN):  acetaminophen   Tablet 650 milliGRAM(s) Oral every 6 hours PRN For Temp greater than 38 C (100.4 F)  naloxone Injectable 0.1 milliGRAM(s) IV Push every 3 minutes PRN For ANY of the following changes in patient status:  A. RR LESS THAN 10 breaths per minute, B. Oxygen saturation LESS THAN 90%, C. Sedation score of 6  ondansetron Injectable 4 milliGRAM(s) IV Push every 6 hours PRN Nausea      PHYSICAL EXAM:  GENERAL: NAD  HEENT: NCAT  CHEST/LUNGS: CTAB, left CT in place.   HEART: RRR,  No murmurs, rubs, or gallops  ABDOMEN: SNTND +BS  EXTREMITIES:  FROM, No clubbing, cyanosis, or edema, palpable pulse  NEURO: No focal neurological deficits  SKIN: No rashes or lesions  INCISION/WOUNDS:                          10.2   6.07  )-----------( 329      ( 2018 23:51 )             31.2        CBC Full  -  ( 2018 23:51 )  WBC Count : 6.07 K/uL  Hemoglobin : 10.2 g/dL  Hematocrit : 31.2 %  Platelet Count - Automated : 329 K/uL  Mean Cell Volume : 92.9 fL  Mean Cell Hemoglobin : 30.4 pg  Mean Cell Hemoglobin Concentration : 32.7 g/dL  Auto Neutrophil # : x  Auto Lymphocyte # : x  Auto Monocyte # : x  Auto Eosinophil # : x  Auto Basophil # : x  Auto Neutrophil % : x  Auto Lymphocyte % : x  Auto Monocyte % : x  Auto Eosinophil % : x  Auto Basophil % : x               135   |  95    |  8                  Ca: 8.6    BMP:   ----------------------------< 94     M.9   (18 @ 23:51)             4.4    |  31    | 0.6                Ph: 4.8      LFT:     TPro: 5.7 / Alb: 2.5 / TBili: 0.9 / DBili: 0.4 / AST: 16 / ALT: 39 / AlkPhos: 74   (18 @ 23:30)

## 2018-07-06 NOTE — DIETITIAN INITIAL EVALUATION ADULT. - PT NOT SOURCE
other (specify)/LOS- pt is alert and oriented and young. No edema noted. LBM 7/6 per pt. Surgical incision. Pt reported previously with constipation but has been on meds and appears resolved. No oral/GI issue at this time.

## 2018-07-07 LAB
ANION GAP SERPL CALC-SCNC: 12 MMOL/L — SIGNIFICANT CHANGE UP (ref 7–14)
BASOPHILS # BLD AUTO: 0.04 K/UL — SIGNIFICANT CHANGE UP (ref 0–0.2)
BASOPHILS NFR BLD AUTO: 0.6 % — SIGNIFICANT CHANGE UP (ref 0–1)
BUN SERPL-MCNC: 12 MG/DL — SIGNIFICANT CHANGE UP (ref 10–20)
CALCIUM SERPL-MCNC: 8.8 MG/DL — SIGNIFICANT CHANGE UP (ref 8.5–10.1)
CHLORIDE SERPL-SCNC: 96 MMOL/L — LOW (ref 98–110)
CO2 SERPL-SCNC: 28 MMOL/L — SIGNIFICANT CHANGE UP (ref 17–32)
CREAT SERPL-MCNC: 0.7 MG/DL — SIGNIFICANT CHANGE UP (ref 0.7–1.5)
EOSINOPHIL # BLD AUTO: 0.28 K/UL — SIGNIFICANT CHANGE UP (ref 0–0.7)
EOSINOPHIL NFR BLD AUTO: 4 % — SIGNIFICANT CHANGE UP (ref 0–8)
GLUCOSE SERPL-MCNC: 91 MG/DL — SIGNIFICANT CHANGE UP (ref 70–99)
HCT VFR BLD CALC: 32.7 % — LOW (ref 42–52)
HGB BLD-MCNC: 10.7 G/DL — LOW (ref 14–18)
IMM GRANULOCYTES NFR BLD AUTO: 2.6 % — HIGH (ref 0.1–0.3)
LYMPHOCYTES # BLD AUTO: 1.78 K/UL — SIGNIFICANT CHANGE UP (ref 1.2–3.4)
LYMPHOCYTES # BLD AUTO: 25.7 % — SIGNIFICANT CHANGE UP (ref 20.5–51.1)
MAGNESIUM SERPL-MCNC: 1.8 MG/DL — SIGNIFICANT CHANGE UP (ref 1.8–2.4)
MCHC RBC-ENTMCNC: 30.5 PG — SIGNIFICANT CHANGE UP (ref 27–31)
MCHC RBC-ENTMCNC: 32.7 G/DL — SIGNIFICANT CHANGE UP (ref 32–37)
MCV RBC AUTO: 93.2 FL — SIGNIFICANT CHANGE UP (ref 80–94)
MONOCYTES # BLD AUTO: 0.68 K/UL — HIGH (ref 0.1–0.6)
MONOCYTES NFR BLD AUTO: 9.8 % — HIGH (ref 1.7–9.3)
NEUTROPHILS # BLD AUTO: 3.96 K/UL — SIGNIFICANT CHANGE UP (ref 1.4–6.5)
NEUTROPHILS NFR BLD AUTO: 57.3 % — SIGNIFICANT CHANGE UP (ref 42.2–75.2)
NRBC # BLD: 0 /100 WBCS — SIGNIFICANT CHANGE UP (ref 0–0)
PHOSPHATE SERPL-MCNC: 4.3 MG/DL — SIGNIFICANT CHANGE UP (ref 2.1–4.9)
PLATELET # BLD AUTO: 339 K/UL — SIGNIFICANT CHANGE UP (ref 130–400)
POTASSIUM SERPL-MCNC: 4.4 MMOL/L — SIGNIFICANT CHANGE UP (ref 3.5–5)
POTASSIUM SERPL-SCNC: 4.4 MMOL/L — SIGNIFICANT CHANGE UP (ref 3.5–5)
RBC # BLD: 3.51 M/UL — LOW (ref 4.7–6.1)
RBC # FLD: 12.1 % — SIGNIFICANT CHANGE UP (ref 11.5–14.5)
SODIUM SERPL-SCNC: 136 MMOL/L — SIGNIFICANT CHANGE UP (ref 135–146)
WBC # BLD: 6.92 K/UL — SIGNIFICANT CHANGE UP (ref 4.8–10.8)
WBC # FLD AUTO: 6.92 K/UL — SIGNIFICANT CHANGE UP (ref 4.8–10.8)

## 2018-07-07 RX ORDER — MORPHINE SULFATE 50 MG/1
2 CAPSULE, EXTENDED RELEASE ORAL EVERY 4 HOURS
Qty: 0 | Refills: 0 | Status: DISCONTINUED | OUTPATIENT
Start: 2018-07-07 | End: 2018-07-07

## 2018-07-07 RX ORDER — OXYCODONE AND ACETAMINOPHEN 5; 325 MG/1; MG/1
1 TABLET ORAL EVERY 4 HOURS
Qty: 0 | Refills: 0 | Status: DISCONTINUED | OUTPATIENT
Start: 2018-07-07 | End: 2018-07-08

## 2018-07-07 RX ORDER — MAGNESIUM SULFATE 500 MG/ML
2 VIAL (ML) INJECTION ONCE
Qty: 0 | Refills: 0 | Status: COMPLETED | OUTPATIENT
Start: 2018-07-07 | End: 2018-07-07

## 2018-07-07 RX ADMIN — CEFTRIAXONE 100 GRAM(S): 500 INJECTION, POWDER, FOR SOLUTION INTRAMUSCULAR; INTRAVENOUS at 06:30

## 2018-07-07 RX ADMIN — Medication 50 GRAM(S): at 10:23

## 2018-07-07 RX ADMIN — OXYCODONE AND ACETAMINOPHEN 1 TABLET(S): 5; 325 TABLET ORAL at 22:07

## 2018-07-07 RX ADMIN — Medication 100 MILLIGRAM(S): at 07:44

## 2018-07-07 RX ADMIN — CEFTRIAXONE 100 GRAM(S): 500 INJECTION, POWDER, FOR SOLUTION INTRAMUSCULAR; INTRAVENOUS at 17:41

## 2018-07-07 RX ADMIN — OXYCODONE AND ACETAMINOPHEN 1 TABLET(S): 5; 325 TABLET ORAL at 22:45

## 2018-07-07 RX ADMIN — LACTULOSE 20 GRAM(S): 10 SOLUTION ORAL at 06:30

## 2018-07-07 RX ADMIN — HEPARIN SODIUM 5000 UNIT(S): 5000 INJECTION INTRAVENOUS; SUBCUTANEOUS at 17:41

## 2018-07-07 RX ADMIN — SENNA PLUS 1 TABLET(S): 8.6 TABLET ORAL at 06:30

## 2018-07-07 RX ADMIN — HEPARIN SODIUM 5000 UNIT(S): 5000 INJECTION INTRAVENOUS; SUBCUTANEOUS at 06:29

## 2018-07-07 NOTE — PROGRESS NOTE ADULT - ASSESSMENT
23y M HD9, POD5 from VATS with partial decortication progressing well.     1. Chest tube  -put to water seal A/P:  This is a 23M s/p left lung VATS decortication. Anterior chest tube has already been removed. Lateral chest tube remains. Pt is tolerating ambulation, pain well controlled.   1. Chest tube  	 -put to water seal  	-possibly out today  	-daily CXR until CT out  2. ID  	- PICC line for rocephin A/P:  This is a 23M s/p left lung VATS decortication. Anterior chest tube has already been removed. Lateral chest tube remains. Pt is tolerating ambulation, pain well controlled.   1. Chest tube  	 -put to water seal  	-possibly out today  	-daily CXR until CT out  2. ID  	- PICC line for rocephin   	-Rocephin 2 gm iv q12h for 48 h then 2 gm iv q24.               -will discharge on Rocephin 2 gm iv q24h for 10 days and then one week of po Augmentin 875 mg q12h for 5 more days.

## 2018-07-07 NOTE — PROGRESS NOTE ADULT - SUBJECTIVE AND OBJECTIVE BOX
GENERAL SURGERY PROGRESS NOTE     TAMI JOHNSON  Male  Hospital day :9  POD: 5  Procedure: VATS, with partial lung decortication, and Chest tube placement    OVERNIGHT EVENTS:  No events overnight     T(F): 98.7 (07-07-18 @ 00:00), Max: 98.7 (07-06-18 @ 15:30)  HR: 75 (07-07-18 @ 00:00) (63 - 87)  BP: 134/65 (07-07-18 @ 00:00) (111/62 - 134/65)  ABP: --  ABP(mean): --  RR: 18 (07-07-18 @ 00:00) (18 - 18)  SpO2: --    CT:  GI proph:    AC/ proph: heparin  Injectable 5000 Unit(s) SubCutaneous every 12 hours    ABx: cefTRIAXone   IVPB 2 Gram(s) IV Intermittent every 12 hours  metroNIDAZOLE  IVPB 500 milliGRAM(s) IV Intermittent every 8 hours      PHYSICAL EXAM:  GENERAL: NAD, well-appearing  CHEST/LUNG: Clear to auscultation bilaterally  HEART: Regular rate and rhythm  ABDOMEN: Soft, Nontender, Nondistended;   EXTREMITIES:  No clubbing, cyanosis, or edema      LABS  Labs:  CAPILLARY BLOOD GLUCOSE                              10.7   6.92  )-----------( 339      ( 07 Jul 2018 00:33 )             32.7       Auto Neutrophil %: 57.3 % (07-07-18 @ 00:33)  Auto Immature Granulocyte %: 2.6 % (07-07-18 @ 00:33)  Auto Neutrophil %: 58.1 % (07-06-18 @ 05:49)  Auto Immature Granulocyte %: 2.3 % (07-06-18 @ 05:49)    07-07    136  |  96<L>  |  12  ----------------------------<  91  4.4   |  28  |  0.7      Calcium, Total Serum: 8.8 mg/dL (07-07-18 @ 00:33)  Magnesium, Serum: 1.8 mg/dL (07-07-18 @ 00:33)  Phosphorus Level, Serum: 4.3 mg/dL (07-07-18 @ 00:33)      LFTs:         Coags:                RADIOLOGY & ADDITIONAL TESTS:      A/P:  This is a 23M s/p VATS decortication on the left. Anterior chest tube removed yesterday, One chest tube remains. Pt is tolerating ambulation, pain well controlled.   - daily CXR  - f/u CT output, check for leaks.   - f/u pathology  - PICC line for ABx GENERAL SURGERY PROGRESS NOTE     TAMI JOHNSON  Male  Hospital day :9  POD: 5  Procedure: VATS, with partial lung decortication, and chest tube placement    OVERNIGHT EVENTS:  No events overnight.     T(F): 98.7 (07-07-18 @ 00:00), Max: 98.7 (07-06-18 @ 15:30)  HR: 75 (07-07-18 @ 00:00) (63 - 87)  BP: 134/65 (07-07-18 @ 00:00) (111/62 - 134/65)  ABP: --  ABP(mean): --  RR: 18 (07-07-18 @ 00:00) (18 - 18)  SpO2: --    CT:  GI proph:    AC/ proph: heparin  Injectable 5000 Unit(s) SubCutaneous every 12 hours    ABx: cefTRIAXone   IVPB 2 Gram(s) IV Intermittent every 12 hours  metroNIDAZOLE  IVPB 500 milliGRAM(s) IV Intermittent every 8 hours      PHYSICAL EXAM:  GENERAL: NAD, well-appearing  CHEST/LUNG: Clear to auscultation bilaterally  HEART: Regular rate and rhythm  ABDOMEN: Soft, Nontender, Nondistended;         LABS  Labs:  CAPILLARY BLOOD GLUCOSE                              10.7   6.92  )-----------( 339      ( 07 Jul 2018 00:33 )             32.7       Auto Neutrophil %: 57.3 % (07-07-18 @ 00:33)  Auto Immature Granulocyte %: 2.6 % (07-07-18 @ 00:33)  Auto Neutrophil %: 58.1 % (07-06-18 @ 05:49)  Auto Immature Granulocyte %: 2.3 % (07-06-18 @ 05:49)    07-07    136  |  96<L>  |  12  ----------------------------<  91  4.4   |  28  |  0.7      Calcium, Total Serum: 8.8 mg/dL (07-07-18 @ 00:33)  Magnesium, Serum: 1.8 mg/dL (07-07-18 @ 00:33)  Phosphorus Level, Serum: 4.3 mg/dL (07-07-18 @ 00:33)      LFTs:         Coags:                RADIOLOGY & ADDITIONAL TESTS:      A/P:  This is a 23M s/p VATS decortication on the left. Anterior chest tube removed yesterday, One chest tube remains. Pt is tolerating ambulation, pain well controlled.   - daily CXR  - f/u CT output, check for leaks.   - f/u pathology  - PICC line for ABx GENERAL SURGERY PROGRESS NOTE     TAMI JOHNSONy  Male  Hospital day :9  POD: 5  Procedure: VATS, with partial lung decortication, and chest tube placement    OVERNIGHT EVENTS:  No events overnight.     T(F): 98.7 (07-07-18 @ 00:00), Max: 98.7 (07-06-18 @ 15:30)  HR: 75 (07-07-18 @ 00:00) (63 - 87)  BP: 134/65 (07-07-18 @ 00:00) (111/62 - 134/65)  ABP: --  ABP(mean): --  RR: 18 (07-07-18 @ 00:00) (18 - 18)  SpO2: --    CT:  GI proph:    AC/ proph: heparin  Injectable 5000 Unit(s) SubCutaneous every 12 hours    ABx: cefTRIAXone   IVPB 2 Gram(s) IV Intermittent every 12 hours  metroNIDAZOLE  IVPB 500 milliGRAM(s) IV Intermittent every 8 hours      PHYSICAL EXAM:  GENERAL: NAD, well-appearing  CHEST/LUNG: Clear to auscultation bilaterally  HEART: Regular rate and rhythm  ABDOMEN: Soft, Nontender, Nondistended;         LABS  Labs:  CAPILLARY BLOOD GLUCOSE                              10.7   6.92  )-----------( 339      ( 07 Jul 2018 00:33 )             32.7       Auto Neutrophil %: 57.3 % (07-07-18 @ 00:33)  Auto Immature Granulocyte %: 2.6 % (07-07-18 @ 00:33)  Auto Neutrophil %: 58.1 % (07-06-18 @ 05:49)  Auto Immature Granulocyte %: 2.3 % (07-06-18 @ 05:49)    07-07    136  |  96<L>  |  12  ----------------------------<  91  4.4   |  28  |  0.7      Calcium, Total Serum: 8.8 mg/dL (07-07-18 @ 00:33)  Magnesium, Serum: 1.8 mg/dL (07-07-18 @ 00:33)  Phosphorus Level, Serum: 4.3 mg/dL (07-07-18 @ 00:33)      LFTs:         Coags:                RADIOLOGY & ADDITIONAL TESTS:

## 2018-07-08 ENCOUNTER — TRANSCRIPTION ENCOUNTER (OUTPATIENT)
Age: 23
End: 2018-07-08

## 2018-07-08 LAB
BASOPHILS # BLD AUTO: 0.05 K/UL — SIGNIFICANT CHANGE UP (ref 0–0.2)
BASOPHILS NFR BLD AUTO: 0.5 % — SIGNIFICANT CHANGE UP (ref 0–1)
EOSINOPHIL # BLD AUTO: 0.33 K/UL — SIGNIFICANT CHANGE UP (ref 0–0.7)
EOSINOPHIL NFR BLD AUTO: 3.5 % — SIGNIFICANT CHANGE UP (ref 0–8)
HCT VFR BLD CALC: 34.9 % — LOW (ref 42–52)
HGB BLD-MCNC: 11.6 G/DL — LOW (ref 14–18)
IMM GRANULOCYTES NFR BLD AUTO: 3.4 % — HIGH (ref 0.1–0.3)
LYMPHOCYTES # BLD AUTO: 2.22 K/UL — SIGNIFICANT CHANGE UP (ref 1.2–3.4)
LYMPHOCYTES # BLD AUTO: 23.3 % — SIGNIFICANT CHANGE UP (ref 20.5–51.1)
MCHC RBC-ENTMCNC: 30.6 PG — SIGNIFICANT CHANGE UP (ref 27–31)
MCHC RBC-ENTMCNC: 33.2 G/DL — SIGNIFICANT CHANGE UP (ref 32–37)
MCV RBC AUTO: 92.1 FL — SIGNIFICANT CHANGE UP (ref 80–94)
MONOCYTES # BLD AUTO: 0.83 K/UL — HIGH (ref 0.1–0.6)
MONOCYTES NFR BLD AUTO: 8.7 % — SIGNIFICANT CHANGE UP (ref 1.7–9.3)
NEUTROPHILS # BLD AUTO: 5.76 K/UL — SIGNIFICANT CHANGE UP (ref 1.4–6.5)
NEUTROPHILS NFR BLD AUTO: 60.6 % — SIGNIFICANT CHANGE UP (ref 42.2–75.2)
NRBC # BLD: 0 /100 WBCS — SIGNIFICANT CHANGE UP (ref 0–0)
PLATELET # BLD AUTO: 405 K/UL — HIGH (ref 130–400)
RBC # BLD: 3.79 M/UL — LOW (ref 4.7–6.1)
RBC # FLD: 11.9 % — SIGNIFICANT CHANGE UP (ref 11.5–14.5)
WBC # BLD: 9.51 K/UL — SIGNIFICANT CHANGE UP (ref 4.8–10.8)
WBC # FLD AUTO: 9.51 K/UL — SIGNIFICANT CHANGE UP (ref 4.8–10.8)

## 2018-07-08 RX ORDER — CEFTRIAXONE 500 MG/1
2 INJECTION, POWDER, FOR SOLUTION INTRAMUSCULAR; INTRAVENOUS
Qty: 0 | Refills: 0 | COMMUNITY
Start: 2018-07-08 | End: 2018-07-18

## 2018-07-08 RX ORDER — SENNA PLUS 8.6 MG/1
1 TABLET ORAL
Qty: 0 | Refills: 0 | COMMUNITY
Start: 2018-07-08

## 2018-07-08 RX ORDER — ACETAMINOPHEN 500 MG
2 TABLET ORAL
Qty: 0 | Refills: 0 | COMMUNITY
Start: 2018-07-08

## 2018-07-08 RX ORDER — LACTULOSE 10 G/15ML
30 SOLUTION ORAL
Qty: 0 | Refills: 0 | COMMUNITY
Start: 2018-07-08

## 2018-07-08 RX ORDER — DOCUSATE SODIUM 100 MG
1 CAPSULE ORAL
Qty: 0 | Refills: 0 | COMMUNITY
Start: 2018-07-08

## 2018-07-08 RX ORDER — DOCUSATE SODIUM 100 MG
1 CAPSULE ORAL
Qty: 7 | Refills: 0 | OUTPATIENT
Start: 2018-07-08 | End: 2018-07-14

## 2018-07-08 RX ORDER — ACETAMINOPHEN 500 MG
650 TABLET ORAL EVERY 6 HOURS
Qty: 0 | Refills: 0 | Status: DISCONTINUED | OUTPATIENT
Start: 2018-07-08 | End: 2018-07-08

## 2018-07-08 RX ORDER — CEFTRIAXONE 500 MG/1
2 INJECTION, POWDER, FOR SOLUTION INTRAMUSCULAR; INTRAVENOUS
Qty: 0 | Refills: 0 | COMMUNITY
Start: 2018-07-08

## 2018-07-08 RX ADMIN — CEFTRIAXONE 100 GRAM(S): 500 INJECTION, POWDER, FOR SOLUTION INTRAMUSCULAR; INTRAVENOUS at 06:15

## 2018-07-08 RX ADMIN — HEPARIN SODIUM 5000 UNIT(S): 5000 INJECTION INTRAVENOUS; SUBCUTANEOUS at 17:29

## 2018-07-08 RX ADMIN — Medication 100 MILLIGRAM(S): at 11:10

## 2018-07-08 RX ADMIN — HEPARIN SODIUM 5000 UNIT(S): 5000 INJECTION INTRAVENOUS; SUBCUTANEOUS at 06:16

## 2018-07-08 RX ADMIN — SENNA PLUS 1 TABLET(S): 8.6 TABLET ORAL at 06:17

## 2018-07-08 RX ADMIN — SENNA PLUS 1 TABLET(S): 8.6 TABLET ORAL at 17:29

## 2018-07-08 RX ADMIN — OXYCODONE AND ACETAMINOPHEN 1 TABLET(S): 5; 325 TABLET ORAL at 06:41

## 2018-07-08 RX ADMIN — CEFTRIAXONE 100 GRAM(S): 500 INJECTION, POWDER, FOR SOLUTION INTRAMUSCULAR; INTRAVENOUS at 17:26

## 2018-07-08 RX ADMIN — LACTULOSE 20 GRAM(S): 10 SOLUTION ORAL at 06:16

## 2018-07-08 RX ADMIN — LACTULOSE 20 GRAM(S): 10 SOLUTION ORAL at 17:29

## 2018-07-08 NOTE — DISCHARGE NOTE ADULT - PATIENT PORTAL LINK FT
You can access the [facility] Patient Portal, offered by [facility], by registering with the following website: [email or web address]

## 2018-07-08 NOTE — DISCHARGE NOTE ADULT - PLAN OF CARE
complete recovery continue antibiotics, iv antibiotics with picc line. continue PO antibiotics.  Take pain medication as needed, take percocet for severe pain.   Follow up with DR FOURNIER(Infectious DIsease) and DR. Cast(cardiothoracic) in one week.

## 2018-07-08 NOTE — DISCHARGE NOTE ADULT - HOSPITAL COURSE
24 y/o M with no PMHx presenting for 2 wk hx of cough, high grade temp of 103F, and red+swollen+purulent d/c from his tonsils. He went to  on the 17th and they sent him home with amoxicillin and 5 day course of prednisone. He was still on the abx course at time of presentation but he finished the prednisone taper. Patient then presented to the ED because his cough had become more productive, with thick green sputum production, as well as persistent fevers, and L sided pleuritic chest pain that worsens with deep breaths. CXR done here shows loculated left sided pleural effusion and +sepsis criteria. s/p VATS decortication left side done on the 2nd of july, chest tube was placed and Y connected. 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 Reviewed By Dr urbina(Infectious Ds). He is being discharged with home antibiotics through his PICC line.

## 2018-07-08 NOTE — PROGRESS NOTE ADULT - ASSESSMENT
Plan:    We are currently awaiting SW for discharge due to his PICC and IV abx needs. Otherwise, continue as plan:    1. Ambulate frequently  2. Continue regular diet.  3. Pain control oxy 5mg as needed

## 2018-07-08 NOTE — PROGRESS NOTE ADULT - PROVIDER SPECIALTY LIST ADULT
CT Surgery
Infectious Disease
Internal Medicine
Pulmonology
Pulmonology
SICU
SICU
Surgery
CT Surgery
Surgery
CT Surgery
Internal Medicine

## 2018-07-08 NOTE — PROGRESS NOTE ADULT - SUBJECTIVE AND OBJECTIVE BOX
GENERAL SURGERY PROGRESS NOTE     TAMI JOHNSONy  Male  Hospital day :9d  POD: 5  Procedure: VATS, with partial lung decortication    OVERNIGHT EVENTS: No acute events overnight. Chest tube was pulled yesterday afternoon. CXR demonstrates no pneumothorax. He is excited to go home soon.    T(F): 98.1 (07-08-18 @ 00:00), Max: 99.5 (07-07-18 @ 15:39)  HR: 94 (07-08-18 @ 00:00) (89 - 107)  BP: 137/87 (07-08-18 @ 00:00) (125/77 - 137/87)  ABP: --  ABP(mean): --  RR: 18 (07-08-18 @ 00:00) (18 - 18)  SpO2: 93% (07-07-18 @ 09:12) (93% - 93%)      07-06-18 @ 07:01  -  07-07-18 @ 07:00  --------------------------------------------------------  IN:  Total IN: 0 mL    OUT:    Chest Tube: 30 mL    Voided: 950 mL  Total OUT: 980 mL    Total NET: -980 mL      07-07-18 @ 07:01  -  07-08-18 @ 04:12  --------------------------------------------------------  IN:  Total IN: 0 mL    OUT:    Voided: 900 mL  Total OUT: 900 mL    Total NET: -900 mL    GI proph:    AC/ proph: heparin  Injectable 5000 Unit(s) SubCutaneous every 12 hours    ABx: cefTRIAXone   IVPB 2 Gram(s) IV Intermittent every 12 hours      PHYSICAL EXAM:  GENERAL: NAD, well-appearing  CHEST/LUNG: Clear to auscultation bilaterally, dressing in place to our CT site  HEART: Regular rate and rhythm  ABDOMEN: Soft, Nontender, Nondistended;   EXTREMITIES:  No clubbing, cyanosis, or edema      LABS  Labs:  CAPILLARY BLOOD GLUCOSE                              11.6   9.51  )-----------( 405      ( 08 Jul 2018 02:22 )             34.9       Auto Neutrophil %: 60.6 % (07-08-18 @ 02:22)  Auto Immature Granulocyte %: 3.4 % (07-08-18 @ 02:22)    07-07    136  |  96<L>  |  12  ----------------------------<  91  4.4   |  28  |  0.7          LFTs:         Coags:                RADIOLOGY & ADDITIONAL TESTS:      A/P

## 2018-07-08 NOTE — DISCHARGE NOTE ADULT - CARE PROVIDER_API CALL
Suhail Quevedo), Surgery; Thoracic Surgery  [address] [place] [id number]  Phone: [phone]  Fax: [phone]    Etienne Hess), Infectious Disease; Internal Medicine  [address] [place] [address]  Phone: [phone]  Fax: [phone]

## 2018-07-08 NOTE — DISCHARGE NOTE ADULT - MEDICATION SUMMARY - MEDICATIONS TO TAKE
I will START or STAY ON the medications listed below when I get home from the hospital:    acetaminophen 325 mg oral tablet  -- 2 tab(s) by mouth every 6 hours, As needed, Mild Pain (1 - 3)  -- Indication: For Pain    cefTRIAXone 2 g intravenous injection  -- 2 gram(s) intravenous every 24 hours  -- Indication: For Pneumonia     Augmentin 875 mg-125 mg oral tablet  -- 1 tab(s) by mouth 2 times a day   -- Finish all this medication unless otherwise directed by prescriber.  Take with food or milk.    -- Indication: For Pneumonia I will START or STAY ON the medications listed below when I get home from the hospital:    oxyCODONE-acetaminophen 5 mg-325 mg oral tablet  -- 1 tab(s) by mouth every 6 hours, As Needed -for severe pain MDD:3  -- Caution federal law prohibits the transfer of this drug to any person other  than the person for whom it was prescribed.  May cause drowsiness.  Alcohol may intensify this effect.  Use care when operating dangerous machinery.  This prescription cannot be refilled.  This product contains acetaminophen.  Do not use  with any other product containing acetaminophen to prevent possible liver damage.  Using more of this medication than prescribed may cause serious breathing problems.    -- Indication: For Pain    cefTRIAXone 2 g intravenous injection  -- 2 gram(s) intravenous every 24 hours  -- Indication: For Pneumonia     docusate sodium 100 mg oral capsule  -- 1 cap(s) by mouth once a day  -- Indication: For stool softener    Augmentin 875 mg-125 mg oral tablet  -- 1 tab(s) by mouth 2 times a day   -- Finish all this medication unless otherwise directed by prescriber.  Take with food or milk.    -- Indication: For Pneumonia

## 2018-07-08 NOTE — DISCHARGE NOTE ADULT - CARE PLAN
Principal Discharge DX:	Pneumonia  Goal:	complete recovery  Assessment and plan of treatment:	continue antibiotics, iv antibiotics with picc line. continue PO antibiotics.  Take pain medication as needed, take percocet for severe pain.   Follow up with DR FOURNIER(Infectious DIsease) and DR. Cast(cardiothoracic) in one week.

## 2018-07-09 VITALS
DIASTOLIC BLOOD PRESSURE: 71 MMHG | SYSTOLIC BLOOD PRESSURE: 119 MMHG | HEART RATE: 63 BPM | TEMPERATURE: 97 F | RESPIRATION RATE: 18 BRPM

## 2018-07-13 DIAGNOSIS — J18.9 PNEUMONIA, UNSPECIFIED ORGANISM: ICD-10-CM

## 2018-07-13 DIAGNOSIS — J86.9 PYOTHORAX WITHOUT FISTULA: ICD-10-CM

## 2018-07-13 DIAGNOSIS — R74.0 NONSPECIFIC ELEVATION OF LEVELS OF TRANSAMINASE AND LACTIC ACID DEHYDROGENASE [LDH]: ICD-10-CM

## 2018-07-13 DIAGNOSIS — A41.9 SEPSIS, UNSPECIFIED ORGANISM: ICD-10-CM

## 2018-07-13 DIAGNOSIS — J90 PLEURAL EFFUSION, NOT ELSEWHERE CLASSIFIED: ICD-10-CM

## 2018-07-17 ENCOUNTER — OUTPATIENT (OUTPATIENT)
Dept: OUTPATIENT SERVICES | Facility: HOSPITAL | Age: 23
LOS: 1 days | Discharge: HOME | End: 2018-07-17

## 2018-07-17 DIAGNOSIS — J90 PLEURAL EFFUSION, NOT ELSEWHERE CLASSIFIED: ICD-10-CM

## 2018-08-17 ENCOUNTER — TRANSCRIPTION ENCOUNTER (OUTPATIENT)
Age: 23
End: 2018-08-17

## 2018-12-27 ENCOUNTER — TRANSCRIPTION ENCOUNTER (OUTPATIENT)
Age: 23
End: 2018-12-27

## 2019-03-21 ENCOUNTER — TRANSCRIPTION ENCOUNTER (OUTPATIENT)
Age: 24
End: 2019-03-21

## 2019-03-28 ENCOUNTER — APPOINTMENT (OUTPATIENT)
Dept: OTOLARYNGOLOGY | Facility: CLINIC | Age: 24
End: 2019-03-28
Payer: COMMERCIAL

## 2019-03-28 DIAGNOSIS — G47.9 SLEEP DISORDER, UNSPECIFIED: ICD-10-CM

## 2019-03-28 DIAGNOSIS — J32.9 CHRONIC SINUSITIS, UNSPECIFIED: ICD-10-CM

## 2019-03-28 DIAGNOSIS — Z78.9 OTHER SPECIFIED HEALTH STATUS: ICD-10-CM

## 2019-03-28 DIAGNOSIS — J31.0 CHRONIC RHINITIS: ICD-10-CM

## 2019-03-28 PROCEDURE — 99203 OFFICE O/P NEW LOW 30 MIN: CPT | Mod: 25

## 2019-03-28 PROCEDURE — 31231 NASAL ENDOSCOPY DX: CPT

## 2019-03-28 RX ORDER — FEXOFENADINE HCL 60 MG/1
60 TABLET, FILM COATED ORAL
Qty: 40 | Refills: 3 | Status: ACTIVE | COMMUNITY
Start: 2019-03-28 | End: 1900-01-01

## 2019-03-28 RX ORDER — AMOXICILLIN AND CLAVULANATE POTASSIUM 875; 125 MG/1; MG/1
875-125 TABLET, COATED ORAL
Qty: 42 | Refills: 0 | Status: ACTIVE | COMMUNITY
Start: 2019-03-28 | End: 1900-01-01

## 2019-03-28 NOTE — REASON FOR VISIT
[Initial Evaluation] : an initial evaluation for [FreeTextEntry2] : chronic sinusitis, and tonsillitis.

## 2019-03-28 NOTE — PROCEDURE
[Anterior rhinoscopy insufficient to account for symptoms] : anterior rhinoscopy insufficient to account for symptoms [Topical Lidocaine] : topical lidocaine [Oxymetazoline HCl] : oxymetazoline HCl [Rigid Endoscope] : examined with a rigid endoscope [Congested] : congested [S-Shaped Deviated] : S-shape deviation [Normal] : the middle meatus had no abnormalities [FreeTextEntry6] : The following anatomic sites were directly examined in a sequential fashion:\par The scope was introduced in the nasal passage between the middle and inferior turbinates to exam the inferior portion of the middle meatus and the fontanelle, as well as the maxillary ostia. Next, the scope was passed medically and posteriorly to the middle turbinates to examine the sphenoethmoid recess and the superior turbinate region.\par

## 2019-03-28 NOTE — HISTORY OF PRESENT ILLNESS
[de-identified] : 24 year old patient is present today for chronic sinusitis, and tonsillitis. \par \par Patient with history of chronic sinusitis. Diagnosed with deviated septum and enlarged adenoids. Patient completed CT scan years ago. Patient notes nasal congestion intermittent. Notes sinus pressure and pain. Patient currently using nasal saline sprays with some relief. Notes snoring. Mouth breather. Denies sleep study being performed. Fatigue in the morning. History of epistaxis, right greater than left. \par \par History enlarged tonsils. Denies dysphagia and odynophagia. Patient notes increased swelling when sick.  [Obstructive Sleep Apnea] : Obstructive Sleep Apnea [Snoring] : snoring [Frequent Nocturnal Awakening] : frequent nocturnal awakening [Unintentional Sleep while Active] : unintentional sleep while active [Awakes Unrefreshed] : awakening unrefreshed [Awakening With Dry Mouth] : awakening with dry mouth

## 2019-03-28 NOTE — PHYSICAL EXAM
[] : septum deviated bilaterally [de-identified] : edema  [Midline] : trachea located in midline position [de-identified] : 3+ [Normal] : no rashes

## 2019-04-29 ENCOUNTER — OUTPATIENT (OUTPATIENT)
Dept: OUTPATIENT SERVICES | Facility: HOSPITAL | Age: 24
LOS: 1 days | Discharge: HOME | End: 2019-04-29

## 2019-04-30 DIAGNOSIS — G47.33 OBSTRUCTIVE SLEEP APNEA (ADULT) (PEDIATRIC): ICD-10-CM

## 2019-06-14 ENCOUNTER — TRANSCRIPTION ENCOUNTER (OUTPATIENT)
Age: 24
End: 2019-06-14

## 2019-06-19 ENCOUNTER — APPOINTMENT (OUTPATIENT)
Dept: OTOLARYNGOLOGY | Facility: CLINIC | Age: 24
End: 2019-06-19
Payer: SELF-PAY

## 2019-06-19 DIAGNOSIS — J34.3 HYPERTROPHY OF NASAL TURBINATES: ICD-10-CM

## 2019-06-19 DIAGNOSIS — J34.2 DEVIATED NASAL SEPTUM: ICD-10-CM

## 2019-06-19 DIAGNOSIS — J34.89 OTHER SPECIFIED DISORDERS OF NOSE AND NASAL SINUSES: ICD-10-CM

## 2019-06-19 PROCEDURE — 99213 OFFICE O/P EST LOW 20 MIN: CPT | Mod: 25

## 2019-06-19 PROCEDURE — 31231 NASAL ENDOSCOPY DX: CPT

## 2019-06-19 NOTE — PHYSICAL EXAM
[de-identified] : edema  [] : septum deviated bilaterally [Midline] : trachea located in midline position [de-identified] : 3+ [Normal] : no rashes

## 2019-06-19 NOTE — HISTORY OF PRESENT ILLNESS
[FreeTextEntry1] : Patient here today following up on sleep disturbance. Patient had sleep study performed and was reviewed and showed mild RICHARD. Pt continues to have severe nasal obstruction and that is refractory to medical treatment. He is chronic mouth breather. \par \par Pt has recurrent tonsil infections and tonsil stones. Pt requires abx at least three times a year for throat infection foe the past several years.

## 2019-06-19 NOTE — PROCEDURE
[Anterior rhinoscopy insufficient to account for symptoms] : anterior rhinoscopy insufficient to account for symptoms [Anatomical Abnormality] : anatomical abnormality [Topical Lidocaine] : topical lidocaine [Oxymetazoline HCl] : oxymetazoline HCl [Rigid Endoscope] : examined with a rigid endoscope [Congested] : congested [S-Shaped Deviated] : S-shape deviation [Normal] : the paranasal sinuses had no abnormalities [FreeTextEntry6] : The following anatomic sites were directly examined in a sequential fashion:\par The scope was introduced in the nasal passage between the middle and inferior turbinates to exam the inferior portion of the middle meatus and the fontanelle, as well as the maxillary ostia. Next, the scope was passed medically and posteriorly to the middle turbinates to examine the sphenoethmoid recess and the superior turbinate region.\par

## 2019-09-19 ENCOUNTER — APPOINTMENT (OUTPATIENT)
Dept: OTOLARYNGOLOGY | Facility: CLINIC | Age: 24
End: 2019-09-19

## 2019-12-17 NOTE — ED PROVIDER NOTE - PMH
12/17/19 1605   Patient Assessment/Suction   Level of Consciousness (AVPU) alert   Respiratory Effort Unlabored;Normal   PRE-TX-O2   O2 Device (Oxygen Therapy) nasal cannula   $ Is the patient on Low Flow Oxygen? Yes   Flow (L/min) 2   SpO2 97 %   Pulse Oximetry Type Intermittent   $ Pulse Oximetry - Multiple Charge Pulse Oximetry - Multiple   Pulse 74   Resp 18   Incentive Spirometer   $ Incentive Spirometer Charges postop instruction   Incentive Spirometer Predicted Level (mL) 1500   Administration (IS) instruction provided, initial   Number of Repetitions (IS) 15   Level Incentive Spirometer (mL) 1500   Patient Tolerance (IS) good   encouraged patient to use Q1 hrs.     No pertinent past medical history

## 2021-05-17 NOTE — ED ADULT NURSE NOTE - CAS TRG GEN SKIN CONDITION
Left voice message for the patient to call back to confirm cscope     Spoke to patient regarding preparation for colonoscopy scheduled 5/19. Verified time of arrival at 1030, procedure time of 1130, dietary modifications, SUPREP bowel preparation , and escort for after procedure.  Instructed to quarantine at home after COVID testing until the day of the procedure.Patient confirmed understanding of all instructions. Informed to contact the office at [phone] with any additional questions.     
Warm

## 2021-07-14 ENCOUNTER — TRANSCRIPTION ENCOUNTER (OUTPATIENT)
Age: 26
End: 2021-07-14

## 2021-09-13 ENCOUNTER — TRANSCRIPTION ENCOUNTER (OUTPATIENT)
Age: 26
End: 2021-09-13

## 2022-05-02 NOTE — PROGRESS NOTE ADULT - GUM GEN PE MLT EXAM PC
Normal genitalia; no lesions; no discharge
none

## 2022-10-31 ENCOUNTER — NON-APPOINTMENT (OUTPATIENT)
Age: 27
End: 2022-10-31

## 2023-01-20 NOTE — PRE-OP CHECKLIST - SELECT TESTS ORDERED
[FreeTextEntry1] : subacute sinusitis\par Amoxicillin  80 mg/kg/d for 10 days
CBC/INR/Type and Screen/CXR/PT/PTT/CMP

## 2023-03-14 NOTE — CONSULT NOTE ADULT - SUBJECTIVE AND OBJECTIVE BOX
Patient is a 23y old  Male who presents with a chief complaint of L sided pna (29 Jun 2018 14:27)      HPI:  24 y/o M with no PMHx presenting for 2 wk hx of cough, high grade temp of 103F, and red+swollen+purulent d/c from his tonsils. He went to  on the 17th and they sent him home with amoxicillin and 5 day course of prednisone. He is still on the abx course but he finished the prednisone taper. Now he is seeking medical attention bc his cough has become more productive, with thick green sputum production, as well as persistent fevers, and L sided pleuritic chest pain that worsens with deep breaths. CXR done here shows loculated left sided pleural effusion and +sepsis criteria. Case was d/w IR Dr Stark who feels there is not enough fluid to warrant a thoracentesis and recommended against ct chest due to radiation in a young pt. However, case was d/w Pulm fellow who recommended pursuing ct chest and providing broad coverage abx. Pt denies any vomiting/nausea, but has been having dec po intake for the past 2 wks. (29 Jun 2018 14:27)    PAST MEDICAL & SURGICAL HISTORY:  No pertinent past medical history      SOCIAL HX:   Smoking   Occasional marijuana         FAMILY HISTORY:  Family history of pneumonia  Family history of hypertension (Father, Mother)  Family history of leukemia (Father)    Allergies    No Known Allergies    PHYSICAL EXAM  Vital Signs Last 24 Hrs  T(C): 36.8 (29 Jun 2018 15:17), Max: 38.4 (29 Jun 2018 10:52)  T(F): 98.3 (29 Jun 2018 15:17), Max: 101.1 (29 Jun 2018 10:52)  HR: 81 (29 Jun 2018 15:17) (81 - 133)  BP: 130/81 (29 Jun 2018 15:17) (130/81 - 135/92)  RR: 18 (29 Jun 2018 15:17) (18 - 20)  SpO2: 99% (29 Jun 2018 15:17) (95% - 99%)  I&O's Summary    General: Comfortable in bed  HEENT:  On RA  Lungs: YAYO over bases  Cardiovascular: RRR, S1S2  Abdomen: BS+ve; soft non tender  Extremities: No LE edema  Neurological:  AAOx3; No focal deficit      LABS:                          14.2   14.66 )-----------( 345      ( 29 Jun 2018 11:26 )             41.2   06-29    134<L>  |  94<L>  |  11  ----------------------------<  107<H>  4.2   |  25  |  0.8    Ca    9.2      29 Jun 2018 11:26    TPro  7.7  /  Alb  4.0  /  TBili  1.6<H>  /  DBili  x   /  AST  54<H>  /  ALT  53<H>  /  AlkPhos  91  06-29  PT/INR - ( 29 Jun 2018 11:26 )   PT: 18.10 sec;   INR: 1.68 ratio    PTT - ( 29 Jun 2018 11:26 )  PTT:30.1 sec                                         LIVER FUNCTIONS - ( 29 Jun 2018 11:26 )  Alb: 4.0 g/dL / Pro: 7.7 g/dL / ALK PHOS: 91 U/L / ALT: 53 U/L / AST: 54 U/L / GGT: x         Lactate (06-29-18 @ 11:53): 0.8    MEDICATIONS  (STANDING):    MEDICATIONS  (PRN):    Radiology:  < from: Xray Chest 2 Views PA/Lat (06.29.18 @ 12:48) >  Impression:      Loculated left sided pleural effusion with left basilar opacity.     Small right basilar effusion and opacity.    < end of copied text > No

## 2023-09-26 ENCOUNTER — NON-APPOINTMENT (OUTPATIENT)
Age: 28
End: 2023-09-26

## 2024-06-07 NOTE — CONSULT NOTE ADULT - SUBJECTIVE AND OBJECTIVE BOX
asdf INTERVENTIONAL RADIOLOGY CONSULT:     Procedure Requested: image guided thoracentesis    HPI:  24 y/o M with no PMHx presenting for 2 wk hx of cough, high grade temp of 103F, and red+swollen+purulent d/c from his tonsils. He went to  on the 17th and they sent him home with amoxicillin and 5 day course of prednisone. He is still on the abx course but he finished the prednisone taper. Now he is seeking medical attention bc his cough has become more productive, with thick green sputum production, as well as persistent fevers, and L sided pleuritic chest pain that worsens with deep breaths. CXR done here shows loculated left sided pleural effusion and +sepsis criteria.     PAST MEDICAL & SURGICAL HISTORY:  No pertinent past medical history    MEDICATIONS  (STANDING):  enoxaparin Injectable 40 milliGRAM(s) SubCutaneous daily  meropenem  IVPB 1000 milliGRAM(s) IV Intermittent every 8 hours  sodium chloride 0.9%. 1000 milliLiter(s) (125 mL/Hr) IV Continuous <Continuous>  vancomycin  IVPB 1750 milliGRAM(s) IV Intermittent every 12 hours    MEDICATIONS  (PRN):  acetaminophen   Tablet. 650 milliGRAM(s) Oral every 6 hours PRN Moderate Pain (4 - 6)    Allergies  No Known Allergies    Social History:   Smoking: Yes [ ]  No [ ]   ______pk yrs  ETOH  Yes [ ]  No [ ]  Social [ ]  DRUGS:  Yes [ ]  No [ ]  if so what______________    FAMILY HISTORY:  Family history of pneumonia  Family history of hypertension (Father, Mother)  Family history of leukemia (Father)    Physical Exam:   Vital Signs Last 24 Hrs  T(C): 36.8 (29 Jun 2018 15:17), Max: 38.4 (29 Jun 2018 10:52)  T(F): 98.3 (29 Jun 2018 15:17), Max: 101.1 (29 Jun 2018 10:52)  HR: 99 (29 Jun 2018 17:22) (81 - 133)  BP: 129/79 (29 Jun 2018 17:22) (129/79 - 135/92)  BP(mean): 98 (29 Jun 2018 17:22) (98 - 98)  RR: 18 (29 Jun 2018 17:22) (18 - 20)  SpO2: 99% (29 Jun 2018 17:22) (95% - 99%)    Labs:                         14.2   14.66 )-----------( 345      ( 29 Jun 2018 11:26 )             41.2     06-29    134<L>  |  94<L>  |  11  ----------------------------<  107<H>  4.2   |  25  |  0.8    Ca    9.2      29 Jun 2018 11:26    TPro  7.7  /  Alb  4.0  /  TBili  1.6<H>  /  DBili  x   /  AST  54<H>  /  ALT  53<H>  /  AlkPhos  91  06-29    PT/INR - ( 29 Jun 2018 11:26 )   PT: 18.10 sec;   INR: 1.68 ratio      PTT - ( 29 Jun 2018 11:26 )  PTT:30.1 sec    Radiology & Additional Studies:     6/29 - CXR -     A/P - 24 y/o M w/cough, possible community acquired pneumonia vs pneumonia complicated by abscess/empyema    1. Cough - CXR findings not suggestive of pleural effusion large enough to drain. Although no large empyema is seen on CXR, CT would be the definitive study. Will continue to monitor.     Thank you for the courtesy of this consult, please call [phone] with any further questions. Vaccine status unknown

## 2025-01-07 NOTE — PROGRESS NOTE ADULT - SUBJECTIVE AND OBJECTIVE BOX
TAMI JOHNSON  [id number]  23y Male    Indication for ICU admission:  Admit Date:06-29-18  ICU Date: 7/2/18  OR Date: 7/2/18    22 y/o M with no PMHx presenting for 2 wk hx of cough, high grade temp of 103F, and red+swollen+purulent d/c from his tonsils. He went to  on the 17th and they sent him home with amoxicillin and 5 day course of prednisone. He is still on the abx course but he finished the prednisone taper. Now he is seeking medical attention bc his cough has become more productive, with thick green sputum production, as well as persistent fevers, and L sided pleuritic chest pain that worsens with deep breaths. CXR done here shows loculated left sided pleural effusion and +sepsis criteria. Case was d/w IR Dr Stark who feels there is not enough fluid to warrant a thoracentesis and recommended against ct chest due to radiation in a young pt. However, case was d/w Pulm fellow who recommended pursuing ct chest and providing broad coverage abx. Pt denies any vomiting/nausea, but has been having dec po intake for the past 2 wks.      No Known Allergies    PAST MEDICAL & SURGICAL HISTORY:  No pertinent past medical history  No significant past surgical history    Home Medications: None        24HRS EVENT:  Neuro: Alert and oriented x3. Denies any pain on Dilaudid PCA.   Cards: Normotensive   Pulmonary: On room air. Chest tube x2 on suction. 200cc output in past 24hrs.   GI: Regular diet. GI prophylaxis  : Macdonald catheter removed and trial of void passed  Infectious Disease: Started vanc. Cont'd Levofloxacin, Cefepime, Clindamycin   Enodcrine: -   Heme: Heparin Sub q, SCD.   A-line removed.     Assessment and plan: 23 year old otherwise healthy male POD1 from Left VATS for Loculated Left Pleural Effusion.    Neuro: Continue Dilaudid PCA. Pain well controlled.  Cardiovascular: Contiue to monitor vitals.  Pulmonary: Continue chest tube to suction. Continue room air. AM CXR  GI: Continue regular diet and GI prophylaxis. IV lock.   Heme: Continue heparin sub q. AM labs.   : Remove macdonald catheter at midnight, TOV. Dictation #[id number]